# Patient Record
Sex: MALE | Race: WHITE | NOT HISPANIC OR LATINO | Employment: FULL TIME | URBAN - METROPOLITAN AREA
[De-identification: names, ages, dates, MRNs, and addresses within clinical notes are randomized per-mention and may not be internally consistent; named-entity substitution may affect disease eponyms.]

---

## 2017-07-26 ENCOUNTER — ALLSCRIPTS OFFICE VISIT (OUTPATIENT)
Dept: OTHER | Facility: OTHER | Age: 37
End: 2017-07-26

## 2017-07-26 DIAGNOSIS — R22.9 LOCALIZED SWELLING, MASS AND LUMP, UNSPECIFIED: ICD-10-CM

## 2017-08-01 ENCOUNTER — LAB CONVERSION - ENCOUNTER (OUTPATIENT)
Dept: OTHER | Facility: OTHER | Age: 37
End: 2017-08-01

## 2017-08-01 LAB
A/G RATIO (HISTORICAL): 1.4 (CALC) (ref 1–2.5)
ALBUMIN SERPL BCP-MCNC: 4.1 G/DL (ref 3.6–5.1)
ALP SERPL-CCNC: 138 U/L (ref 40–115)
ALT SERPL W P-5'-P-CCNC: 67 U/L (ref 9–46)
AST SERPL W P-5'-P-CCNC: 33 U/L (ref 10–40)
BASOPHILS # BLD AUTO: 1 %
BASOPHILS # BLD AUTO: 52 CELLS/UL (ref 0–200)
BILIRUB SERPL-MCNC: 0.3 MG/DL (ref 0.2–1.2)
BUN SERPL-MCNC: 17 MG/DL (ref 7–25)
BUN/CREA RATIO (HISTORICAL): ABNORMAL (CALC) (ref 6–22)
CALCIUM SERPL-MCNC: 9.6 MG/DL (ref 8.6–10.3)
CHLORIDE SERPL-SCNC: 102 MMOL/L (ref 98–110)
CHOLEST SERPL-MCNC: 176 MG/DL (ref 125–200)
CHOLEST/HDLC SERPL: 3.1 (CALC)
CO2 SERPL-SCNC: 28 MMOL/L (ref 20–31)
CREAT SERPL-MCNC: 0.98 MG/DL (ref 0.6–1.35)
DEPRECATED RDW RBC AUTO: 13.2 % (ref 11–15)
EGFR AFRICAN AMERICAN (HISTORICAL): 113 ML/MIN/1.73M2
EGFR-AMERICAN CALC (HISTORICAL): 97 ML/MIN/1.73M2
EOSINOPHIL # BLD AUTO: 1.7 %
EOSINOPHIL # BLD AUTO: 88 CELLS/UL (ref 15–500)
GAMMA GLOBULIN (HISTORICAL): 3 G/DL (CALC) (ref 1.9–3.7)
GLUCOSE (HISTORICAL): 87 MG/DL (ref 65–99)
HCT VFR BLD AUTO: 45 % (ref 38.5–50)
HDLC SERPL-MCNC: 57 MG/DL
HGB BLD-MCNC: 14.7 G/DL (ref 13.2–17.1)
LDL CHOLESTEROL (HISTORICAL): 108 MG/DL (CALC)
LYMPHOCYTES # BLD AUTO: 1674 CELLS/UL (ref 850–3900)
LYMPHOCYTES # BLD AUTO: 32.2 %
MCH RBC QN AUTO: 29.3 PG (ref 27–33)
MCHC RBC AUTO-ENTMCNC: 32.7 G/DL (ref 32–36)
MCV RBC AUTO: 89.8 FL (ref 80–100)
MONOCYTES # BLD AUTO: 468 CELLS/UL (ref 200–950)
MONOCYTES (HISTORICAL): 9 %
NEUTROPHILS # BLD AUTO: 2917 CELLS/UL (ref 1500–7800)
NEUTROPHILS # BLD AUTO: 56.1 %
NON-HDL-CHOL (CHOL-HDL) (HISTORICAL): 119 MG/DL (CALC)
PLATELET # BLD AUTO: 282 THOUSAND/UL (ref 140–400)
PMV BLD AUTO: 9.2 FL (ref 7.5–12.5)
POTASSIUM SERPL-SCNC: 4.6 MMOL/L (ref 3.5–5.3)
RBC # BLD AUTO: 5.01 MILLION/UL (ref 4.2–5.8)
SODIUM SERPL-SCNC: 136 MMOL/L (ref 135–146)
TOTAL PROTEIN (HISTORICAL): 7.1 G/DL (ref 6.1–8.1)
TRIGL SERPL-MCNC: 56 MG/DL
WBC # BLD AUTO: 5.2 THOUSAND/UL (ref 3.8–10.8)

## 2017-08-02 ENCOUNTER — GENERIC CONVERSION - ENCOUNTER (OUTPATIENT)
Dept: OTHER | Facility: OTHER | Age: 37
End: 2017-08-02

## 2017-08-07 ENCOUNTER — TRANSCRIBE ORDERS (OUTPATIENT)
Dept: ADMINISTRATIVE | Facility: HOSPITAL | Age: 37
End: 2017-08-07

## 2017-08-07 DIAGNOSIS — R22.9 LUMP OF SKIN: Primary | ICD-10-CM

## 2017-08-11 ENCOUNTER — HOSPITAL ENCOUNTER (OUTPATIENT)
Dept: RADIOLOGY | Age: 37
Discharge: HOME | End: 2017-08-11
Payer: COMMERCIAL

## 2017-08-11 DIAGNOSIS — R22.9 LOCALIZED SWELLING, MASS AND LUMP, UNSPECIFIED: ICD-10-CM

## 2017-08-11 PROCEDURE — 74177 CT ABD & PELVIS W/CONTRAST: CPT

## 2017-08-11 RX ADMIN — IOHEXOL 100 ML: 350 INJECTION, SOLUTION INTRAVENOUS at 15:01

## 2017-08-15 ENCOUNTER — GENERIC CONVERSION - ENCOUNTER (OUTPATIENT)
Dept: OTHER | Facility: OTHER | Age: 37
End: 2017-08-15

## 2018-01-13 VITALS
SYSTOLIC BLOOD PRESSURE: 140 MMHG | WEIGHT: 182.8 LBS | DIASTOLIC BLOOD PRESSURE: 90 MMHG | HEIGHT: 70 IN | TEMPERATURE: 97.5 F | BODY MASS INDEX: 26.17 KG/M2

## 2018-01-14 NOTE — RESULT NOTES
Discussion/Summary   Mild elevation of liver enzymes - cholesterol is stable  Repeat liver enzymes in 4 months and avoid alcohol and tylenol prior to test       Verified Results  (1) LIPID PANEL, FASTING [id number] 09:46AM Leafy Latin     Test Name Result Flag Reference   CHOLESTEROL, TOTAL 176 mg/dL  125-200   HDL CHOLESTEROL 57 mg/dL  > OR = 40   TRIGLICERIDES 56 mg/dL  <757   LDL-CHOLESTEROL 108 mg/dL (calc)  <130   Desirable range <100 mg/dL for patients with CHD or  diabetes and <70 mg/dL for diabetic patients with  known heart disease  CHOL/HDLC RATIO 3 1 (calc)  < OR = 5 0   NON HDL CHOLESTEROL 119 mg/dL (calc)     Target for non-HDL cholesterol is 30 mg/dL higher than   LDL cholesterol target  (1) COMPREHENSIVE METABOLIC PANEL [id number] 04:30CE Leafy Latin     Test Name Result Flag Reference   GLUCOSE 87 mg/dL  65-99   Fasting reference interval   UREA NITROGEN (BUN) 17 mg/dL  7-25   CREATININE 0 98 mg/dL  0 60-1 35   eGFR NON-AFR   AMERICAN 97 mL/min/1 73m2  > OR = 60   eGFR AFRICAN AMERICAN 113 mL/min/1 73m2  > OR = 60   BUN/CREATININE RATIO   9-52   NOT APPLICABLE (calc)   SODIUM 136 mmol/L  135-146   POTASSIUM 4 6 mmol/L  3 5-5 3   CHLORIDE 102 mmol/L     CARBON DIOXIDE 28 mmol/L  20-31   CALCIUM 9 6 mg/dL  8 6-10 3   PROTEIN, TOTAL 7 1 g/dL  6 1-8 1   ALBUMIN 4 1 g/dL  3 6-5 1   GLOBULIN 3 0 g/dL (calc)  1 9-3 7   ALBUMIN/GLOBULIN RATIO 1 4 (calc)  1 0-2 5   BILIRUBIN, TOTAL 0 3 mg/dL  0 2-1 2   ALKALINE PHOSPHATASE 138 U/L H    AST 33 U/L  10-40   ALT 67 U/L H 9-46     (1) CBC/PLT/DIFF [id number] 09:46AM Leafy Latin   REPORT COMMENT:  FASTING:YES     Test Name Result Flag Reference   WHITE BLOOD CELL COUNT 5 2 Thousand/uL  3 8-10 8   RED BLOOD CELL COUNT 5 01 Million/uL  4 20-5 80   HEMOGLOBIN 14 7 g/dL  13 2-17 1   HEMATOCRIT 45 0 %  38 5-50 0   MCV 89 8 fL  80 0-100 0   MCH 29 3 pg  27 0-33 0   MCHC 32 7 g/dL  32 0-36 0   RDW 13 2 %  11 0-15 0 PLATELET COUNT 045 Thousand/uL  140-400   ABSOLUTE NEUTROPHILS 2917 cells/uL  2941-1171   ABSOLUTE LYMPHOCYTES 1674 cells/uL  850-3900   ABSOLUTE MONOCYTES 468 cells/uL  200-950   ABSOLUTE EOSINOPHILS 88 cells/uL     ABSOLUTE BASOPHILS 52 cells/uL  0-200   NEUTROPHILS 56 1 %     LYMPHOCYTES 32 2 %     MONOCYTES 9 0 %     EOSINOPHILS 1 7 %     BASOPHILS 1 0 %     MPV 9 2 fL  7 5-12 5       Plan  Elevation of level of transaminase or lactic acid dehydrogenase (LDH)    · (1) COMPREHENSIVE METABOLIC PANEL; Status:Active;  Requested for:[id number];

## 2018-01-16 NOTE — RESULT NOTES
Verified Results  * CT ABDOMEN PELVIS W CONTRAST [id number] 02:47PM Elizabeth Daugherty Order Number: [id number]    - Patient Instructions: To schedule this appointment, please contact Central Scheduling at [phone]  Test Name Result Flag Reference   CT ABDOMEN PELVIS W CONTRAST (Report)     CT ABDOMEN AND PELVIS WITH IV CONTRAST     INDICATION: 53-year-old male, right lower quadrant pain, swelling     COMPARISON: None  TECHNIQUE: CT examination of the abdomen and pelvis was performed  Reformatted images were created in axial, sagittal, and coronal planes  Radiation dose length product (DLP) for this visit: 431 mGy-cm   This examination, like all CT scans performed in the [facility], was performed utilizing techniques to minimize radiation dose exposure, including the use of iterative    reconstruction and automated exposure control  IV Contrast: 100 mL of iohexol (OMNIPAQUE)      Enteric Contrast: Enteric contrast was administered  FINDINGS:     ABDOMEN     LOWER CHEST: No significant abnormalities identified in the lower chest      LIVER/BILIARY TREE: Unremarkable  GALLBLADDER: No calcified gallstones  No pericholecystic inflammatory change  SPLEEN: Unremarkable  PANCREAS: Unremarkable  ADRENAL GLANDS: Unremarkable  KIDNEYS/URETERS: Unremarkable  No hydronephrosis  STOMACH AND BOWEL: No evidence of bowel obstruction  Difficult to assess adequately due to paucity of oral contrast  Mild diffuse rectal wall thickening suggesting proctitis, infectious or inflammatory  Sigmoidoscopic assessment recommended  APPENDIX: No findings to suggest appendicitis  ABDOMINOPELVIC CAVITY: No ascites or free intraperitoneal air  No lymphadenopathy  VESSELS: Unremarkable for patient's age  PELVIS     REPRODUCTIVE ORGANS: Unremarkable for patient's age  URINARY BLADDER: Unremarkable  ABDOMINAL WALL/INGUINAL REGIONS: Unremarkable  OSSEOUS STRUCTURES: No acute fracture or destructive osseous lesion  IMPRESSION:   No evidence of abdominal wall mass or hernia     Mild diffuse rectal wall thickening suspicious for proctitis           ##[id number]##sigslh       Workstation performed: [id number]     Signed by:   Wolf Cunningham MD   8/15/17

## 2018-07-27 ENCOUNTER — OFFICE VISIT (OUTPATIENT)
Dept: FAMILY MEDICINE CLINIC | Facility: CLINIC | Age: 38
End: 2018-07-27
Payer: COMMERCIAL

## 2018-07-27 VITALS
HEART RATE: 72 BPM | HEIGHT: 70 IN | RESPIRATION RATE: 16 BRPM | DIASTOLIC BLOOD PRESSURE: 78 MMHG | WEIGHT: 188 LBS | TEMPERATURE: 97.4 F | SYSTOLIC BLOOD PRESSURE: 116 MMHG | BODY MASS INDEX: 26.92 KG/M2

## 2018-07-27 DIAGNOSIS — K52.9 COLITIS: Primary | ICD-10-CM

## 2018-07-27 PROCEDURE — 1036F TOBACCO NON-USER: CPT | Performed by: FAMILY MEDICINE

## 2018-07-27 PROCEDURE — 3008F BODY MASS INDEX DOCD: CPT | Performed by: FAMILY MEDICINE

## 2018-07-27 PROCEDURE — 99214 OFFICE O/P EST MOD 30 MIN: CPT | Performed by: FAMILY MEDICINE

## 2018-07-27 RX ORDER — DICYCLOMINE HCL 20 MG
1 TABLET ORAL EVERY 6 HOURS PRN
COMMUNITY
Start: 2018-07-26 | End: 2019-08-14 | Stop reason: ALTCHOICE

## 2018-07-27 NOTE — PATIENT INSTRUCTIONS
Colitis   WHAT YOU NEED TO KNOW:   Colitis is swelling and irritation of your colon  Colitis may be caused by ulcers or a problem with your immune system  Bacteria, a virus, or a parasite may also cause colitis  The cause may not be known  You may have diarrhea, abdominal pain, fever, or blood or mucus in your bowel movement  DISCHARGE INSTRUCTIONS:   Return to the emergency department if:   · You have sudden trouble breathing  · Your bowel movements are black or have blood in them  · You have blood in your vomit  · You have severe abdominal pain or your abdomen is swollen and feels hard  · You have any of the following signs of dehydration:     ¨ Dizziness or weakness    ¨ Dry mouth, cracked lips, or severe thirst    ¨ Fast heartbeat or breathing    ¨ Urinating very little or not at all  Contact your healthcare provider if:   · Your symptoms get worse or do not go away  · You have a fever, chills, cough, or feel weak and achy  · You suddenly lose weight without trying  · You have questions or concerns about your condition or care  Medicines:   · Medicines  may be given to decrease inflammation in your colon and treat diarrhea  · Take your medicine as directed  Contact your healthcare provider if you think your medicine is not helping or if you have side effects  Tell him of her if you are allergic to any medicine  Keep a list of the medicines, vitamins, and herbs you take  Include the amounts, and when and why you take them  Bring the list or the pill bottles to follow-up visits  Carry your medicine list with you in case of an emergency  Manage your symptoms:   · Drink liquids as directed  to help prevent dehydration  Good liquids to drink include water, juice, and broth  Ask how much liquid to drink each day  You may need to drink an oral rehydration solution (ORS)  An ORS contains a balance of water, salt, and sugar to replace body fluids lost during diarrhea       · Eat a variety of healthy foods  Healthy foods include fruits, vegetables, whole-grain breads, beans, low-fat dairy products, lean meats, and fish  You may need to eat several small meals throughout the day instead of large meals  Avoid spicy foods, caffeine, chocolate, and foods high in fat  · Talk to your healthcare provider before you take NSAIDs  NSAIDs can cause worsen your symptoms if ulcers are causing your colitis  · Start to exercise when you feel better  Regular exercise helps your bowels work normally  Ask about the best exercise plan for you  Follow up with your healthcare provider as directed: You may need to return for a colonoscopy or other tests  Write down how often you have a bowel movements and what they look like  Bring this to your follow-up visits  Write down your questions so you remember to ask them during your visits  © 2017 [employer] Information is for End User's use only and may not be sold, redistributed or otherwise used for commercial purposes  All illustrations and images included in CareNotes® are the copyrighted property of A [employer]  or [employer]  The above information is an  only  It is not intended as medical advice for individual conditions or treatments  Talk to your doctor, nurse or pharmacist before following any medical regimen to see if it is safe and effective for you

## 2018-07-27 NOTE — PROGRESS NOTES
FAMILY MEDICINE PROGRESS NOTE  Christopher Carrera 42 y o  male   DATE: July 27, 2018     ASSESSMENT and PLAN:  Christopher Carrera is a 42 y o  male with:     1  Colitis  Patient seen in the ER at [facility] yesterday, I reviewed the CT scan that showed left-sided colitis  CBC, CMP, lipase were all within normal limits, so less likely infectious  May have inflammatory colitis, patient may need a workup this recurs in the future, though denies any similar previous episodes but does have irregular bowel habits  Patient has not yet picked up his dicyclomine, encouraged him to start taking the medication, follow the diet that he was given in the ER, advised of signs and symptoms for which to call back or go to the ER  If symptoms persist would refer to GI for possible colonoscopy and workup for ulcerative colitis  SUBJECTIVE:  Christopher Carrera is a 42 y o  male who presents today with a chief complaint of Follow-up (ER yesterday lower abdominal pain)  Started 3-4 days ago with an uneasy feeling and thought it was just his usual odd stomach sensation  But then over the next few days he developed headaches, chills, sweats with light diarrhea, nausea so went to the ER  He was told it was an inflamed intestine and given Dicyclomine  Started doing just chicken noodle soup  Tmax on home temp was 102 4 F  Denies blood in the stool, vomiting  Pt was in the [facility] ED yesterday and was diagnosed with colitis  CT abd showed Colitis, most severe along the left colon  I reviewed the labs that the patient had done in the ER  CBC had normal WBC count of 6 8, no neutrophilia, normal lab  CMP was normal other than an elevated alk-phos of 179, AST 41 and ALT 58 chest mildly elevated, low albumin 3 3, otherwise normal CMP  Lipase normal 125  Doesn't think he's had this before  He had a "bowel blockage" as a teenager  Took Correctol and now is regulated to once a week BMs        Review of Systems   Constitutional:        See HPI for ROS     I have reviewed the patient's PMH, Social History, Medication List and Allergies as appropriate  OBJECTIVE:  /78   Pulse 72   Temp (!) 97 4 °F (36 3 °C)   Resp 16   Ht 5' 10" (1 778 m)   Wt 85 3 kg (188 lb)   BMI 26 98 kg/m²    Physical Exam   Constitutional: He appears well-developed and well-nourished  No distress  HENT:   Head: Normocephalic and atraumatic  Cardiovascular: Normal rate, regular rhythm and normal heart sounds  No murmur heard  Pulmonary/Chest: Effort normal and breath sounds normal  No respiratory distress  He has no wheezes  Abdominal: Soft  Normal appearance and bowel sounds are normal  There is no hepatosplenomegaly  There is tenderness in the left lower quadrant  There is no rigidity, no rebound and no guarding  Neurological: He is alert  Skin: He is not diaphoretic  Vitals reviewed  Abhishek Sullivan MD    Note: Portions of the record may have been created with voice recognition software  Occasional wrong word or "sound a like" substitutions may have occurred due to the inherent limitations of voice recognition software  Read the chart carefully and recognize, using context, where substitutions have occurred

## 2019-07-18 ENCOUNTER — OFFICE VISIT (OUTPATIENT)
Dept: OBGYN CLINIC | Facility: CLINIC | Age: 39
End: 2019-07-18
Payer: OTHER MISCELLANEOUS

## 2019-07-18 ENCOUNTER — APPOINTMENT (OUTPATIENT)
Dept: RADIOLOGY | Facility: CLINIC | Age: 39
End: 2019-07-18
Payer: OTHER MISCELLANEOUS

## 2019-07-18 VITALS
DIASTOLIC BLOOD PRESSURE: 79 MMHG | HEIGHT: 70 IN | HEART RATE: 76 BPM | SYSTOLIC BLOOD PRESSURE: 142 MMHG | WEIGHT: 185 LBS | BODY MASS INDEX: 26.48 KG/M2

## 2019-07-18 DIAGNOSIS — S52.601A CLOSED FRACTURE OF DISTAL ENDS OF RIGHT RADIUS AND ULNA, INITIAL ENCOUNTER: Primary | ICD-10-CM

## 2019-07-18 DIAGNOSIS — S52.501A CLOSED FRACTURE OF DISTAL ENDS OF RIGHT RADIUS AND ULNA, INITIAL ENCOUNTER: Primary | ICD-10-CM

## 2019-07-18 DIAGNOSIS — M25.531 PAIN IN RIGHT WRIST: ICD-10-CM

## 2019-07-18 DIAGNOSIS — M25.531 PAIN IN RIGHT WRIST: Primary | ICD-10-CM

## 2019-07-18 PROCEDURE — 25600 CLTX DST RDL FX/EPHYS SEP WO: CPT | Performed by: ORTHOPAEDIC SURGERY

## 2019-07-18 PROCEDURE — 73110 X-RAY EXAM OF WRIST: CPT

## 2019-07-18 PROCEDURE — 99203 OFFICE O/P NEW LOW 30 MIN: CPT | Performed by: ORTHOPAEDIC SURGERY

## 2019-07-18 NOTE — PROGRESS NOTES
Assessment/Plan:  1  Closed fracture of distal end of right radius, initial encounter  Fracture / Dislocation Treatment   2  Pain in right wrist         Scribe Attestation    I,:   Daniel Sosa MA am acting as a scribe while in the presence of the attending physician :        I,:   Tisha Tucker, DO personally performed the services described in this documentation    as scribed in my presence :            The physiology of a fractured bone was discussed with the patient today  With nondisplaced or minimally displaced fractures, conservative treatment often results in a functional recovery  Typically, these fractures are immobilized in either a cast or splint depending on the pattern  Radiographs are typically taken at intervals throughout the fracture healing to ensure that muscular forces do not cause loss of reduction or alignment  If the fracture loses its alignment, surgical intervention may be required to stabilize it  Medical conditions such as diabetes, osteoporosis, vitamin D deficiency, and a history of or exposure to smoking may delay or prevent fracture healing  Patient was placed a short-arm cast today, which is tolerated well  Did advise him that we will likely be immobilized for 6 weeks time  He will be placed in this cast for 4 weeks and then transition to removable brace for 2 more weeks  Will see him back in 1 week for a recheck and new x-rays in the cast     Subjective:   Phylicia Thomas is a 34 y o  male who presents to the office today for evaluation of his right wrist   This is a worker's compensation case  Patient states on 07/09/2019 he fell off a trailer at work and sustained injury to his right wrist   Patient states that he was away for work in [place] and was seen in emergency room close the area in which he was working in  X-rays were obtained in the ED in his placed a splint    Patient states he was not cleared to see a physician by his worker's  until today   For the last 2 weeks he has been wearing his splint and has only removed it wants to readjust it  Today he denies any significant pain in the splint  He denies any numbness and tingling  He denies any new injury  Review of Systems   Constitutional: Negative for chills, fever and unexpected weight change  HENT: Negative for hearing loss, nosebleeds and sore throat  Eyes: Negative for pain, redness and visual disturbance  Respiratory: Negative for cough, shortness of breath and wheezing  Cardiovascular: Negative for chest pain, palpitations and leg swelling  Gastrointestinal: Negative for abdominal pain, nausea and vomiting  Endocrine: Negative for polydipsia and polyuria  Genitourinary: Negative for dysuria and hematuria  Musculoskeletal: Positive for arthralgias, joint swelling and myalgias  Skin: Negative for rash and wound  Neurological: Negative for dizziness, numbness and headaches  Psychiatric/Behavioral: Negative for agitation, decreased concentration and suicidal ideas           Past Medical History:   Diagnosis Date    Small bowel obstruction (HCC)        Past Surgical History:   Procedure Laterality Date    WISDOM TOOTH EXTRACTION         Family History   Problem Relation Age of Onset    No Known Problems Mother     Asthma Sister        Social History     Occupational History    Not on file   Tobacco Use    Smoking status: Never Smoker    Smokeless tobacco: Never Used   Substance and Sexual Activity    Alcohol use: Yes     Comment: rare    Drug use: No    Sexual activity: Not on file         Current Outpatient Medications:     dicyclomine (BENTYL) 20 mg tablet, Take 1 tablet by mouth every 6 (six) hours as needed, Disp: , Rfl:     No Known Allergies    Objective:  Vitals:    07/18/19 1354   BP: 142/79   Pulse: 76       Ortho Exam   Right wrist:  Skin intact  Moderate swelling noted  Ecchymosis noted  Tender to palpate over the distal radius  Full and free finger range of motion noted  Sensation intact  Radial pulse 2 +  No deformity      Physical Exam   Constitutional: He is oriented to person, place, and time  He appears well-developed  HENT:   Head: Normocephalic and atraumatic  Eyes: Conjunctivae are normal    Neck: Neck supple  Cardiovascular: Intact distal pulses  Pulmonary/Chest: Effort normal    Neurological: He is alert and oriented to person, place, and time  Skin: Skin is warm and dry  Psychiatric: He has a normal mood and affect  His behavior is normal        I have personally reviewed pertinent films in PACS and my interpretation is as follows:    X-rays or right wrist obtained on 7/18/19 demonstrate distal radius fracture that is in stable alignment  There is near anatomic alignment of the fracture        Fracture / Dislocation Treatment  Date/Time: 7/18/2019 2:02 PM  Performed by: Martha Guillaume DO  Authorized by: Martha Guillaume DO     Patient Location:  Clinic  Consent given by:  Patient  Patient identity confirmed:  Verbally with patient  Injury location:  Forearm  Location details:  Right forearm  Injury type:  Fracture  Fracture type: distal radius    Fracture type: distal radius    Neurovascular status: Neurovascularly intact    Distal perfusion: normal    Neurological function: normal    Range of motion: reduced    Manipulation performed?: No    Immobilization:  Cast  Cast type:  Short arm  Neurovascular status: Neurovascularly intact    Distal perfusion: normal    Neurological function: normal    Range of motion: unchanged    Patient tolerance:  Patient tolerated the procedure well with no immediate complications

## 2019-07-22 ENCOUNTER — TELEPHONE (OUTPATIENT)
Dept: OBGYN CLINIC | Facility: HOSPITAL | Age: 39
End: 2019-07-22

## 2019-07-22 NOTE — TELEPHONE ENCOUNTER
Dr Shara Andre called to see if when patient is in on 7/25/19 if we could address the work status of patient  Pt off unit via wheelchair w/transporter to MRI.

## 2019-07-25 ENCOUNTER — APPOINTMENT (OUTPATIENT)
Dept: RADIOLOGY | Facility: CLINIC | Age: 39
End: 2019-07-25
Payer: OTHER MISCELLANEOUS

## 2019-07-25 ENCOUNTER — OFFICE VISIT (OUTPATIENT)
Dept: OBGYN CLINIC | Facility: CLINIC | Age: 39
End: 2019-07-25

## 2019-07-25 VITALS
SYSTOLIC BLOOD PRESSURE: 114 MMHG | HEART RATE: 76 BPM | DIASTOLIC BLOOD PRESSURE: 77 MMHG | WEIGHT: 181.2 LBS | BODY MASS INDEX: 25.94 KG/M2 | HEIGHT: 70 IN

## 2019-07-25 DIAGNOSIS — S52.601D CLOSED FRACTURE OF DISTAL ENDS OF RIGHT RADIUS AND ULNA WITH ROUTINE HEALING, SUBSEQUENT ENCOUNTER: ICD-10-CM

## 2019-07-25 DIAGNOSIS — S52.501D CLOSED FRACTURE OF DISTAL ENDS OF RIGHT RADIUS AND ULNA WITH ROUTINE HEALING, SUBSEQUENT ENCOUNTER: Primary | ICD-10-CM

## 2019-07-25 DIAGNOSIS — S52.501D CLOSED FRACTURE OF DISTAL ENDS OF RIGHT RADIUS AND ULNA WITH ROUTINE HEALING, SUBSEQUENT ENCOUNTER: ICD-10-CM

## 2019-07-25 DIAGNOSIS — S52.601D CLOSED FRACTURE OF DISTAL ENDS OF RIGHT RADIUS AND ULNA WITH ROUTINE HEALING, SUBSEQUENT ENCOUNTER: Primary | ICD-10-CM

## 2019-07-25 PROCEDURE — 99024 POSTOP FOLLOW-UP VISIT: CPT | Performed by: ORTHOPAEDIC SURGERY

## 2019-07-25 PROCEDURE — 73100 X-RAY EXAM OF WRIST: CPT

## 2019-07-25 NOTE — PROGRESS NOTES
Assessment/Plan:  1  Closed fracture of distal ends of right radius and ulna with routine healing, subsequent encounter  XR wrist 2 vw right       Scribe Attestation    I,:   Eve Robertson MA am acting as a scribe while in the presence of the attending physician :        I,:   Isabella Garcia, DO personally performed the services described in this documentation    as scribed in my presence :              Jose Manuel Bennett is doing well  Short arm cast is intact and fitting appropriately  X-rays are unchanged and we can continue to treat this conservatively  Patient will continue with the short arm cast for the next 2 weeks  Patient will follow up in 2 weeks for cast removal and repeat x-ray  Patient will remain out of work at this time  Subjective:   Anaya Barker is a 34 y o  male who presents to the office today for follow up evaluation 1 week s/p closed treatment for a right distal radius fracture  Patient states he has been compliant with cast use  He denies any issues with the cast        Review of Systems   Constitutional: Negative for chills and fever  HENT: Negative for drooling and sneezing  Eyes: Negative for redness  Respiratory: Negative for cough and wheezing  Gastrointestinal: Negative for nausea and vomiting  Musculoskeletal: Negative for arthralgias, joint swelling and myalgias  Neurological: Negative for weakness and numbness  Psychiatric/Behavioral: Negative for behavioral problems  The patient is not nervous/anxious            Past Medical History:   Diagnosis Date    Small bowel obstruction (HCC)        Past Surgical History:   Procedure Laterality Date    WISDOM TOOTH EXTRACTION         Family History   Problem Relation Age of Onset    No Known Problems Mother     Asthma Sister        Social History     Occupational History    Not on file   Tobacco Use    Smoking status: Never Smoker    Smokeless tobacco: Never Used   Substance and Sexual Activity    Alcohol use: Yes     Comment: rare    Drug use: No    Sexual activity: Not on file         Current Outpatient Medications:     dicyclomine (BENTYL) 20 mg tablet, Take 1 tablet by mouth every 6 (six) hours as needed, Disp: , Rfl:     No Known Allergies    Objective: There were no vitals filed for this visit  Ortho Exam     Right wrist    80 sup  85 pro  Short arm cast intact   No wounds secondary to cast  Compartments soft  Brisk capillary refill  Sensation intact median, radial, and ulnar nerve     Physical Exam   Constitutional: He is oriented to person, place, and time  He appears well-developed and well-nourished  HENT:   Head: Normocephalic and atraumatic  Eyes: Conjunctivae are normal  Right eye exhibits no discharge  Left eye exhibits no discharge  Neck: Normal range of motion  Neck supple  Cardiovascular: Normal rate and intact distal pulses  Pulmonary/Chest: Effort normal  No respiratory distress  Musculoskeletal:   As noted in HPI   Neurological: He is alert and oriented to person, place, and time  Skin: Skin is warm and dry  Psychiatric: He has a normal mood and affect  His behavior is normal  Judgment and thought content normal        I have personally reviewed pertinent films in PACS and my interpretation is as follows:X-ray right wrist performed in the office today are unchanged from previous films  Alignment is excellent

## 2019-08-08 ENCOUNTER — OFFICE VISIT (OUTPATIENT)
Dept: OBGYN CLINIC | Facility: CLINIC | Age: 39
End: 2019-08-08

## 2019-08-08 ENCOUNTER — APPOINTMENT (OUTPATIENT)
Dept: RADIOLOGY | Facility: CLINIC | Age: 39
End: 2019-08-08
Payer: OTHER MISCELLANEOUS

## 2019-08-08 ENCOUNTER — TRANSCRIBE ORDERS (OUTPATIENT)
Dept: ADMINISTRATIVE | Facility: HOSPITAL | Age: 39
End: 2019-08-08

## 2019-08-08 ENCOUNTER — APPOINTMENT (OUTPATIENT)
Dept: LAB | Facility: HOSPITAL | Age: 39
End: 2019-08-08
Attending: ORTHOPAEDIC SURGERY
Payer: OTHER MISCELLANEOUS

## 2019-08-08 VITALS
SYSTOLIC BLOOD PRESSURE: 132 MMHG | HEART RATE: 85 BPM | HEIGHT: 70 IN | BODY MASS INDEX: 25.91 KG/M2 | DIASTOLIC BLOOD PRESSURE: 78 MMHG | WEIGHT: 181 LBS

## 2019-08-08 DIAGNOSIS — S52.601D CLOSED FRACTURE OF DISTAL ENDS OF RIGHT RADIUS AND ULNA WITH ROUTINE HEALING, SUBSEQUENT ENCOUNTER: ICD-10-CM

## 2019-08-08 DIAGNOSIS — S52.601D CLOSED FRACTURE OF DISTAL ENDS OF RIGHT RADIUS AND ULNA WITH ROUTINE HEALING, SUBSEQUENT ENCOUNTER: Primary | ICD-10-CM

## 2019-08-08 DIAGNOSIS — S52.501D CLOSED FRACTURE OF DISTAL ENDS OF RIGHT RADIUS AND ULNA WITH ROUTINE HEALING, SUBSEQUENT ENCOUNTER: ICD-10-CM

## 2019-08-08 DIAGNOSIS — S52.501D CLOSED FRACTURE OF DISTAL ENDS OF RIGHT RADIUS AND ULNA WITH ROUTINE HEALING, SUBSEQUENT ENCOUNTER: Primary | ICD-10-CM

## 2019-08-08 PROBLEM — S52.501A CLOSED FRACTURE OF RIGHT DISTAL RADIUS AND ULNA: Status: ACTIVE | Noted: 2019-08-08

## 2019-08-08 PROBLEM — S52.601A CLOSED FRACTURE OF RIGHT DISTAL RADIUS AND ULNA: Status: ACTIVE | Noted: 2019-08-08

## 2019-08-08 LAB
ANION GAP SERPL CALCULATED.3IONS-SCNC: 9 MMOL/L (ref 4–13)
APTT PPP: 25 SECONDS (ref 23–37)
BASOPHILS # BLD AUTO: 0.06 THOUSANDS/ΜL (ref 0–0.1)
BASOPHILS NFR BLD AUTO: 1 % (ref 0–1)
BUN SERPL-MCNC: 9 MG/DL (ref 5–25)
CALCIUM SERPL-MCNC: 9.1 MG/DL (ref 8.3–10.1)
CHLORIDE SERPL-SCNC: 102 MMOL/L (ref 100–108)
CO2 SERPL-SCNC: 29 MMOL/L (ref 21–32)
CREAT SERPL-MCNC: 0.96 MG/DL (ref 0.6–1.3)
EOSINOPHIL # BLD AUTO: 0.11 THOUSAND/ΜL (ref 0–0.61)
EOSINOPHIL NFR BLD AUTO: 2 % (ref 0–6)
ERYTHROCYTE [DISTWIDTH] IN BLOOD BY AUTOMATED COUNT: 13.3 % (ref 11.6–15.1)
GFR SERPL CREATININE-BSD FRML MDRD: 98 ML/MIN/1.73SQ M
GLUCOSE SERPL-MCNC: 119 MG/DL (ref 65–140)
HCT VFR BLD AUTO: 42.7 % (ref 36.5–49.3)
HGB BLD-MCNC: 13.9 G/DL (ref 12–17)
IMM GRANULOCYTES # BLD AUTO: 0.03 THOUSAND/UL (ref 0–0.2)
IMM GRANULOCYTES NFR BLD AUTO: 1 % (ref 0–2)
INR PPP: 0.98 (ref 0.86–1.16)
LYMPHOCYTES # BLD AUTO: 1.66 THOUSANDS/ΜL (ref 0.6–4.47)
LYMPHOCYTES NFR BLD AUTO: 26 % (ref 14–44)
MCH RBC QN AUTO: 28.3 PG (ref 26.8–34.3)
MCHC RBC AUTO-ENTMCNC: 32.6 G/DL (ref 31.4–37.4)
MCV RBC AUTO: 87 FL (ref 82–98)
MONOCYTES # BLD AUTO: 0.68 THOUSAND/ΜL (ref 0.17–1.22)
MONOCYTES NFR BLD AUTO: 11 % (ref 4–12)
NEUTROPHILS # BLD AUTO: 3.91 THOUSANDS/ΜL (ref 1.85–7.62)
NEUTS SEG NFR BLD AUTO: 59 % (ref 43–75)
NRBC BLD AUTO-RTO: 0 /100 WBCS
PLATELET # BLD AUTO: 307 THOUSANDS/UL (ref 149–390)
PMV BLD AUTO: 9.6 FL (ref 8.9–12.7)
POTASSIUM SERPL-SCNC: 3.4 MMOL/L (ref 3.5–5.3)
PROTHROMBIN TIME: 10.3 SECONDS (ref 9.4–11.7)
RBC # BLD AUTO: 4.91 MILLION/UL (ref 3.88–5.62)
SODIUM SERPL-SCNC: 140 MMOL/L (ref 136–145)
WBC # BLD AUTO: 6.45 THOUSAND/UL (ref 4.31–10.16)

## 2019-08-08 PROCEDURE — 80048 BASIC METABOLIC PNL TOTAL CA: CPT

## 2019-08-08 PROCEDURE — 99024 POSTOP FOLLOW-UP VISIT: CPT | Performed by: ORTHOPAEDIC SURGERY

## 2019-08-08 PROCEDURE — 36415 COLL VENOUS BLD VENIPUNCTURE: CPT

## 2019-08-08 PROCEDURE — 85730 THROMBOPLASTIN TIME PARTIAL: CPT

## 2019-08-08 PROCEDURE — 73100 X-RAY EXAM OF WRIST: CPT

## 2019-08-08 PROCEDURE — 85610 PROTHROMBIN TIME: CPT

## 2019-08-08 PROCEDURE — 85025 COMPLETE CBC W/AUTO DIFF WBC: CPT

## 2019-08-08 RX ORDER — CEFAZOLIN SODIUM 2 G/50ML
2000 SOLUTION INTRAVENOUS ONCE
Status: CANCELLED | OUTPATIENT
Start: 2019-08-21 | End: 2019-08-08

## 2019-08-08 NOTE — H&P (VIEW-ONLY)
Assessment/Plan:  1  Closed fracture of distal ends of right radius and ulna with routine healing, subsequent encounter  XR wrist 2 vw right       Scribe Attestation    I,:   Eve Robertson MA am acting as a scribe while in the presence of the attending physician :        I,:   Deana Cabezas DO personally performed the services described in this documentation    as scribed in my presence :              I discussed with Delfino Hermosillo today that his fracture did displace as compared to previous films  There is increased dorsal angulation at this time  His priors x-rays demonstrated anatomic alignment  This was confirmed on multiple x-rays in the past   Unfortunately the fracture has moved  This was explained to the patient  It was explained to the patient that he does run the risk of DRUJ hand carpal arthritis if this will ladder healing but in proper position  He also may have stiffness loss of range of motion with subluxation of the DRUJ  Treatment options were discussed in the form of open reduction internal fixation right distal radius as well as continued conservative care  Benny Alegria He was agreeable to this  Risk of the surgery are inclusive of but not limited to bleeding, infection, nerve injury, blood clot, worsening of symptoms, not achieving the anticipated results, persistent stiffness, weakness and the need for additional surgery  The patient verbally stated they understood those risks and would like to proceed with the surgery  Surgical consent was signed in the office today  He was fitted and provided with a cock-up wrist brace that he was instructed to wear full time  He will remain nonweightbearing with the RUE  I will see him the day of surgery  Subjective:   Ida Mcclure is a 34 y o  male who presents to the office today for follow up evaluation s/p closed treatment for a right distal radius fracture   Patient states that his cast was loose and it did come off one time two days ago while he was sleeping  He denies any numbness or tingling  Review of Systems   Constitutional: Negative for chills and fever  HENT: Negative for drooling and sneezing  Eyes: Negative for redness  Respiratory: Negative for cough and wheezing  Gastrointestinal: Negative for nausea and vomiting  Musculoskeletal: Negative for arthralgias, joint swelling and myalgias  Neurological: Negative for weakness and numbness  Psychiatric/Behavioral: Negative for behavioral problems  The patient is not nervous/anxious  Past Medical History:   Diagnosis Date    Small bowel obstruction (HCC)        Past Surgical History:   Procedure Laterality Date    WISDOM TOOTH EXTRACTION         Family History   Problem Relation Age of Onset    No Known Problems Mother     Asthma Sister        Social History     Occupational History    Not on file   Tobacco Use    Smoking status: Never Smoker    Smokeless tobacco: Never Used   Substance and Sexual Activity    Alcohol use: Yes     Comment: rare    Drug use: No    Sexual activity: Not on file         Current Outpatient Medications:     dicyclomine (BENTYL) 20 mg tablet, Take 1 tablet by mouth every 6 (six) hours as needed, Disp: , Rfl:     No Known Allergies    Objective: There were no vitals filed for this visit  Ortho Exam     Right wrist    No wounds secondary to cast  Compartments soft  Brisk capillary refill  Sensation intact median, radial, and ulnar nerve   Motor intact median radial nerve  Mild deformity at the wrist dorsal angulation    Physical Exam   Constitutional: He is oriented to person, place, and time  He appears well-developed and well-nourished  HENT:   Head: Normocephalic and atraumatic  Eyes: Conjunctivae are normal  Right eye exhibits no discharge  Left eye exhibits no discharge  Neck: Normal range of motion  Neck supple  Cardiovascular: Normal rate and intact distal pulses  Pulmonary/Chest: Effort normal  No respiratory distress  Musculoskeletal:   As noted in HPI   Neurological: He is alert and oriented to person, place, and time  Skin: Skin is warm and dry  Psychiatric: He has a normal mood and affect  His behavior is normal  Judgment and thought content normal        I have personally reviewed pertinent films in PACS and my interpretation is as follows:X-ray right wrist performed in the office today demonstrates further displacement right distal radius fracture  There is approximately 30° of dorsal angulation at this time

## 2019-08-08 NOTE — PROGRESS NOTES
Assessment/Plan:  1  Closed fracture of distal ends of right radius and ulna with routine healing, subsequent encounter  XR wrist 2 vw right       Scribe Attestation    I,:   Eve Robertson MA am acting as a scribe while in the presence of the attending physician :        I,:   Goran Flores DO personally performed the services described in this documentation    as scribed in my presence :              I discussed with Guillermo Whyte today that his fracture did displace as compared to previous films  There is increased dorsal angulation at this time  His priors x-rays demonstrated anatomic alignment  This was confirmed on multiple x-rays in the past   Unfortunately the fracture has moved  This was explained to the patient  It was explained to the patient that he does run the risk of DRUJ hand carpal arthritis if this will ladder healing but in proper position  He also may have stiffness loss of range of motion with subluxation of the DRUJ  Treatment options were discussed in the form of open reduction internal fixation right distal radius as well as continued conservative care  Roger Sr He was agreeable to this  Risk of the surgery are inclusive of but not limited to bleeding, infection, nerve injury, blood clot, worsening of symptoms, not achieving the anticipated results, persistent stiffness, weakness and the need for additional surgery  The patient verbally stated they understood those risks and would like to proceed with the surgery  Surgical consent was signed in the office today  He was fitted and provided with a cock-up wrist brace that he was instructed to wear full time  He will remain nonweightbearing with the RUE  I will see him the day of surgery  Subjective:   Monica Rodriguez is a 34 y o  male who presents to the office today for follow up evaluation s/p closed treatment for a right distal radius fracture   Patient states that his cast was loose and it did come off one time two days ago while he was sleeping  He denies any numbness or tingling  Review of Systems   Constitutional: Negative for chills and fever  HENT: Negative for drooling and sneezing  Eyes: Negative for redness  Respiratory: Negative for cough and wheezing  Gastrointestinal: Negative for nausea and vomiting  Musculoskeletal: Negative for arthralgias, joint swelling and myalgias  Neurological: Negative for weakness and numbness  Psychiatric/Behavioral: Negative for behavioral problems  The patient is not nervous/anxious  Past Medical History:   Diagnosis Date    Small bowel obstruction (HCC)        Past Surgical History:   Procedure Laterality Date    WISDOM TOOTH EXTRACTION         Family History   Problem Relation Age of Onset    No Known Problems Mother     Asthma Sister        Social History     Occupational History    Not on file   Tobacco Use    Smoking status: Never Smoker    Smokeless tobacco: Never Used   Substance and Sexual Activity    Alcohol use: Yes     Comment: rare    Drug use: No    Sexual activity: Not on file         Current Outpatient Medications:     dicyclomine (BENTYL) 20 mg tablet, Take 1 tablet by mouth every 6 (six) hours as needed, Disp: , Rfl:     No Known Allergies    Objective: There were no vitals filed for this visit  Ortho Exam     Right wrist    No wounds secondary to cast  Compartments soft  Brisk capillary refill  Sensation intact median, radial, and ulnar nerve   Motor intact median radial nerve  Mild deformity at the wrist dorsal angulation    Physical Exam   Constitutional: He is oriented to person, place, and time  He appears well-developed and well-nourished  HENT:   Head: Normocephalic and atraumatic  Eyes: Conjunctivae are normal  Right eye exhibits no discharge  Left eye exhibits no discharge  Neck: Normal range of motion  Neck supple  Cardiovascular: Normal rate and intact distal pulses  Pulmonary/Chest: Effort normal  No respiratory distress  Musculoskeletal:   As noted in HPI   Neurological: He is alert and oriented to person, place, and time  Skin: Skin is warm and dry  Psychiatric: He has a normal mood and affect  His behavior is normal  Judgment and thought content normal        I have personally reviewed pertinent films in PACS and my interpretation is as follows:X-ray right wrist performed in the office today demonstrates further displacement right distal radius fracture  There is approximately 30° of dorsal angulation at this time

## 2019-08-11 PROBLEM — IMO0002 ELEVATION OF LEVEL OF TRANSAMINASE OR LACTIC ACID DEHYDROGENASE (LDH): Status: ACTIVE | Noted: 2017-08-02

## 2019-08-14 ENCOUNTER — OFFICE VISIT (OUTPATIENT)
Dept: FAMILY MEDICINE CLINIC | Facility: CLINIC | Age: 39
End: 2019-08-14
Payer: OTHER MISCELLANEOUS

## 2019-08-14 VITALS
WEIGHT: 181 LBS | HEIGHT: 70 IN | HEART RATE: 80 BPM | TEMPERATURE: 98.1 F | BODY MASS INDEX: 25.91 KG/M2 | RESPIRATION RATE: 16 BRPM | DIASTOLIC BLOOD PRESSURE: 70 MMHG | SYSTOLIC BLOOD PRESSURE: 100 MMHG

## 2019-08-14 DIAGNOSIS — S52.501S CLOSED FRACTURE OF DISTAL ENDS OF RIGHT RADIUS AND ULNA, SEQUELA: Primary | ICD-10-CM

## 2019-08-14 DIAGNOSIS — S52.601S CLOSED FRACTURE OF DISTAL ENDS OF RIGHT RADIUS AND ULNA, SEQUELA: Primary | ICD-10-CM

## 2019-08-14 DIAGNOSIS — K59.09 CHRONIC CONSTIPATION: ICD-10-CM

## 2019-08-14 DIAGNOSIS — K21.9 GASTROESOPHAGEAL REFLUX DISEASE, ESOPHAGITIS PRESENCE NOT SPECIFIED: ICD-10-CM

## 2019-08-14 PROCEDURE — 99243 OFF/OP CNSLTJ NEW/EST LOW 30: CPT | Performed by: FAMILY MEDICINE

## 2019-08-14 RX ORDER — OMEPRAZOLE 10 MG/1
10 CAPSULE, DELAYED RELEASE ORAL DAILY
COMMUNITY
End: 2020-12-28

## 2019-08-14 NOTE — PROGRESS NOTES
Assessment/Plan:    No problem-specific Assessment & Plan notes found for this encounter  Medically cleared for surgery     Diagnoses and all orders for this visit:    Closed fracture of distal ends of right radius and ulna, sequela    Chronic constipation    Gastroesophageal reflux disease, esophagitis presence not specified    Other orders  -     Polyethylene Glycol 3350 (DULCOLAX BALANCE PO); Take by mouth  -     omeprazole (PriLOSEC) 10 mg delayed release capsule; Take 10 mg by mouth daily Take 1 pill 5 days a week      per EMR records, he has a PCP in [facility], Dr Christie Reed        No follow-ups on file  Subjective:      Patient ID: Jf Barcenas is a 34 y o  male  Chief Complaint   Patient presents with    preop clearence     wmcma       HPI  Planned procedure: right wrist ORIF, workers comp  Surgeon: Dr Davi Aldridge  Date: 8/21/19  Anesthesia type: unknown    Prior major surgeries: none  Problems with anesthesia in past: n    Can walk 4 blocks or 2 flights of stairs: y  History of excessive bleeding: n  History of excessive clotting: n  Personal history of DVT or Pe: n    Taking NSAIDS: none except prn  Takings vitamins D or E or A or fish oil supplements: n    Usual am medications: dulocolax prn    Bmp ok, nonfasting, slight low K at 3 4  CBC normal  coags normal    The following portions of the patient's history were reviewed and updated as appropriate: allergies, current medications, past family history, past medical history, past social history, past surgical history and problem list     Review of Systems   Constitutional: Negative for chills and fever  Respiratory: Negative for shortness of breath  Cardiovascular: Negative for chest pain           Current Outpatient Medications   Medication Sig Dispense Refill    omeprazole (PriLOSEC) 10 mg delayed release capsule Take 10 mg by mouth daily Take 1 pill 5 days a week      Polyethylene Glycol 3350 (DULCOLAX BALANCE PO) Take by mouth       No current facility-administered medications for this visit  Objective:    /70   Pulse 80   Temp 98 1 °F (36 7 °C)   Resp 16   Ht 5' 10" (1 778 m)   Wt 82 1 kg (181 lb)   BMI 25 97 kg/m²        Physical Exam   Constitutional: He appears well-developed  No distress  HENT:   Head: Normocephalic  Mouth/Throat: No oropharyngeal exudate  Eyes: Conjunctivae are normal  No scleral icterus  Neck: Neck supple  Cardiovascular: Normal rate, normal heart sounds and intact distal pulses  No murmur heard  Pulmonary/Chest: Effort normal  No respiratory distress  Abdominal: Soft  Bowel sounds are normal    Musculoskeletal: He exhibits tenderness  He exhibits no edema or deformity  Right wrist pain with rom, neurovasc intact   Neurological: He is alert  Skin: Skin is warm and dry  No pallor  Psychiatric: His behavior is normal  Thought content normal    Nursing note and vitals reviewed               Tonya Stark DO

## 2019-08-16 NOTE — PRE-PROCEDURE INSTRUCTIONS
Pre-Surgery Instructions:   Medication Instructions    omeprazole (PriLOSEC) 10 mg delayed release capsule Instructed patient per Anesthesia Guidelines   Polyethylene Glycol 3350 (DULCOLAX BALANCE PO) Patient was instructed by Physician and understands  Pre op instructions reviewed with pt via phone  Instructed to take prilosec a m of surgery   step-dad Leandra Sher, to provide mobile # on arrival  My Surgical Experience    The following information was developed to assist you to prepare for your operation  What do I need to do before coming to the hospital?   Arrange for a responsible person to drive you to and from the hospital    Arrange care for your children at home  Children are not allowed in the recovery areas of the hospital   Plan to wear clothing that is easy to put on and take off  If you are having shoulder surgery, wear a shirt that buttons or zippers in the front  Bathing  o Shower the evening before and the morning of your surgery with an antibacterial soap  Please refer to the Pre Op Showering Instructions for Surgery Patients Sheet   o Remove nail polish and all body piercing jewelry  o Do not shave any body part for at least 24 hours before surgery-this includes face, arms, legs and upper body  Food  o Nothing to eat or drink after midnight the night before your surgery  This includes candy and chewing gum  o Exception: If your surgery is after 12:00pm (noon), you may have clear liquids such as 7-Up®, ginger ale, apple or cranberry juice, Jell-O®, water, or clear broth until 8:00 am  o Do not drink milk or juice with pulp on the morning before surgery  o Do not drink alcohol 24 hours before surgery  Medicine  o Follow instructions you received from your surgeon about which medicines you may take on the day of surgery  o If instructed to take medicine on the morning of surgery, take pills with just a small sip of water   Call your prescribing doctor for specific information on what to do if you take insulin    What should I bring to the hospital?    Bring:  Herminia Shelling or a walker, if you have them, for foot or knee surgery   A list of the daily medicines, vitamins, minerals, herbals and nutritional supplements you take  Include the dosages of medicines and the time you take them each day   Glasses, dentures or hearing aids   Minimal clothing; you will be wearing hospital sleepwear   Photo ID; required to verify your identity   If you have a Living Will or Power of , bring a copy of the documents   If you have an ostomy, bring an extra pouch and any supplies you use    Do not bring   Medicines or inhalers   Money, valuables or jewelry    What other information should I know about the day of surgery?  Notify your surgeons if you develop a cold, sore throat, cough, fever, rash or any other illness   Report to the Ambulatory Surgical/Same Day Surgery Unit   You will be instructed to stop at Registration only if you have not been pre-registered   Inform your  fi they do not stay that they will be asked by the staff to leave a phone number where they can be reached   Be available to be reached before surgery  In the event the operating room schedule changes, you may be asked to come in earlier or later than expected    *It is important to tell your doctor and others involved in your health care if you are taking or have been taking any non-prescription drugs, vitamins, minerals, herbals or other nutritional supplements   Any of these may interact with some food or medicines and cause a reaction

## 2019-08-20 ENCOUNTER — ANESTHESIA EVENT (OUTPATIENT)
Dept: PERIOP | Facility: HOSPITAL | Age: 39
End: 2019-08-20
Payer: OTHER MISCELLANEOUS

## 2019-08-21 ENCOUNTER — APPOINTMENT (OUTPATIENT)
Dept: RADIOLOGY | Facility: HOSPITAL | Age: 39
End: 2019-08-21
Payer: OTHER MISCELLANEOUS

## 2019-08-21 ENCOUNTER — HOSPITAL ENCOUNTER (OUTPATIENT)
Facility: HOSPITAL | Age: 39
Setting detail: OUTPATIENT SURGERY
Discharge: HOME | End: 2019-08-21
Attending: ORTHOPAEDIC SURGERY | Admitting: ORTHOPAEDIC SURGERY
Payer: OTHER MISCELLANEOUS

## 2019-08-21 ENCOUNTER — ANESTHESIA (OUTPATIENT)
Dept: PERIOP | Facility: HOSPITAL | Age: 39
End: 2019-08-21
Payer: OTHER MISCELLANEOUS

## 2019-08-21 VITALS
TEMPERATURE: 97 F | OXYGEN SATURATION: 95 % | RESPIRATION RATE: 18 BRPM | HEART RATE: 76 BPM | SYSTOLIC BLOOD PRESSURE: 132 MMHG | WEIGHT: 182.5 LBS | HEIGHT: 70 IN | DIASTOLIC BLOOD PRESSURE: 65 MMHG | BODY MASS INDEX: 26.13 KG/M2

## 2019-08-21 DIAGNOSIS — S52.601A CLOSED FRACTURE OF DISTAL ENDS OF RIGHT RADIUS AND ULNA, INITIAL ENCOUNTER: Primary | ICD-10-CM

## 2019-08-21 DIAGNOSIS — S52.501A CLOSED FRACTURE OF DISTAL ENDS OF RIGHT RADIUS AND ULNA, INITIAL ENCOUNTER: Primary | ICD-10-CM

## 2019-08-21 PROCEDURE — C1713 ANCHOR/SCREW BN/BN,TIS/BN: HCPCS | Performed by: ORTHOPAEDIC SURGERY

## 2019-08-21 PROCEDURE — 25607 OPTX DST RD XARTC FX/EPI SEP: CPT | Performed by: ORTHOPAEDIC SURGERY

## 2019-08-21 PROCEDURE — 73100 X-RAY EXAM OF WRIST: CPT

## 2019-08-21 DEVICE — 2.4MM CORTEX SCREW SLF-TPNG WITH T8 STARDRIVE RECESS 14MM: Type: IMPLANTABLE DEVICE | Site: WRIST | Status: FUNCTIONAL

## 2019-08-21 DEVICE — 2.4MM VA LOCKING SCREW STARDRIVE 16MM: Type: IMPLANTABLE DEVICE | Site: WRIST | Status: FUNCTIONAL

## 2019-08-21 DEVICE — 2.4MM VA-LCP 2-CLMN VLR DSTL RADIUS PL 6H HD/4H SHAFT/RIGHT
Type: IMPLANTABLE DEVICE | Site: WRIST | Status: FUNCTIONAL
Brand: VA-LCP

## 2019-08-21 DEVICE — 2.4MM VA LOCKING SCREW STARDRIVE 20MM: Type: IMPLANTABLE DEVICE | Site: WRIST | Status: FUNCTIONAL

## 2019-08-21 DEVICE — 2.7MM CORTEX SCREW SLF-TPNG WITH T8 STARDRIVE RECESS 14MM: Type: IMPLANTABLE DEVICE | Site: WRIST | Status: FUNCTIONAL

## 2019-08-21 DEVICE — 2.4MM VA LOCKING SCREW STARDRIVE 18MM: Type: IMPLANTABLE DEVICE | Site: WRIST | Status: FUNCTIONAL

## 2019-08-21 DEVICE — 2.7MM CORTEX SCREW SLF-TPNG WITH T8 STARDRIVE RECESS 16MM: Type: IMPLANTABLE DEVICE | Site: WRIST | Status: FUNCTIONAL

## 2019-08-21 RX ORDER — PROPOFOL 10 MG/ML
INJECTION, EMULSION INTRAVENOUS AS NEEDED
Status: DISCONTINUED | OUTPATIENT
Start: 2019-08-21 | End: 2019-08-21 | Stop reason: SURG

## 2019-08-21 RX ORDER — OXYCODONE HYDROCHLORIDE 5 MG/1
5 TABLET ORAL EVERY 4 HOURS PRN
Qty: 30 TABLET | Refills: 0 | Status: SHIPPED | OUTPATIENT
Start: 2019-08-21 | End: 2019-08-31

## 2019-08-21 RX ORDER — DEXAMETHASONE SODIUM PHOSPHATE 10 MG/ML
INJECTION, SOLUTION INTRAMUSCULAR; INTRAVENOUS AS NEEDED
Status: DISCONTINUED | OUTPATIENT
Start: 2019-08-21 | End: 2019-08-21 | Stop reason: SURG

## 2019-08-21 RX ORDER — SODIUM CHLORIDE, SODIUM LACTATE, POTASSIUM CHLORIDE, CALCIUM CHLORIDE 600; 310; 30; 20 MG/100ML; MG/100ML; MG/100ML; MG/100ML
125 INJECTION, SOLUTION INTRAVENOUS CONTINUOUS
Status: DISCONTINUED | OUTPATIENT
Start: 2019-08-21 | End: 2019-08-21 | Stop reason: HOSPADM

## 2019-08-21 RX ORDER — FENTANYL CITRATE/PF 50 MCG/ML
25 SYRINGE (ML) INJECTION
Status: DISCONTINUED | OUTPATIENT
Start: 2019-08-21 | End: 2019-08-21 | Stop reason: HOSPADM

## 2019-08-21 RX ORDER — CEFAZOLIN SODIUM 2 G/50ML
2000 SOLUTION INTRAVENOUS ONCE
Status: COMPLETED | OUTPATIENT
Start: 2019-08-21 | End: 2019-08-21

## 2019-08-21 RX ORDER — FENTANYL CITRATE 50 UG/ML
INJECTION, SOLUTION INTRAMUSCULAR; INTRAVENOUS AS NEEDED
Status: DISCONTINUED | OUTPATIENT
Start: 2019-08-21 | End: 2019-08-21 | Stop reason: SURG

## 2019-08-21 RX ORDER — MIDAZOLAM HYDROCHLORIDE 1 MG/ML
INJECTION INTRAMUSCULAR; INTRAVENOUS AS NEEDED
Status: DISCONTINUED | OUTPATIENT
Start: 2019-08-21 | End: 2019-08-21 | Stop reason: SURG

## 2019-08-21 RX ORDER — ONDANSETRON 2 MG/ML
INJECTION INTRAMUSCULAR; INTRAVENOUS AS NEEDED
Status: DISCONTINUED | OUTPATIENT
Start: 2019-08-21 | End: 2019-08-21 | Stop reason: SURG

## 2019-08-21 RX ORDER — MAGNESIUM HYDROXIDE 1200 MG/15ML
LIQUID ORAL AS NEEDED
Status: DISCONTINUED | OUTPATIENT
Start: 2019-08-21 | End: 2019-08-21 | Stop reason: HOSPADM

## 2019-08-21 RX ORDER — ONDANSETRON 2 MG/ML
4 INJECTION INTRAMUSCULAR; INTRAVENOUS ONCE AS NEEDED
Status: COMPLETED | OUTPATIENT
Start: 2019-08-21 | End: 2019-08-21

## 2019-08-21 RX ORDER — MEPERIDINE HYDROCHLORIDE 25 MG/ML
12.5 INJECTION INTRAMUSCULAR; INTRAVENOUS; SUBCUTANEOUS
Status: DISCONTINUED | OUTPATIENT
Start: 2019-08-21 | End: 2019-08-21 | Stop reason: HOSPADM

## 2019-08-21 RX ORDER — LIDOCAINE HYDROCHLORIDE 10 MG/ML
INJECTION, SOLUTION INFILTRATION; PERINEURAL AS NEEDED
Status: DISCONTINUED | OUTPATIENT
Start: 2019-08-21 | End: 2019-08-21 | Stop reason: SURG

## 2019-08-21 RX ADMIN — PROPOFOL 200 MG: 10 INJECTION, EMULSION INTRAVENOUS at 13:42

## 2019-08-21 RX ADMIN — CEFAZOLIN SODIUM 2000 MG: 2 SOLUTION INTRAVENOUS at 13:44

## 2019-08-21 RX ADMIN — FENTANYL CITRATE 25 MCG: 50 INJECTION, SOLUTION INTRAMUSCULAR; INTRAVENOUS at 15:48

## 2019-08-21 RX ADMIN — ONDANSETRON 4 MG: 2 INJECTION INTRAMUSCULAR; INTRAVENOUS at 16:55

## 2019-08-21 RX ADMIN — LIDOCAINE HYDROCHLORIDE 50 MG: 10 INJECTION, SOLUTION INFILTRATION; PERINEURAL at 13:42

## 2019-08-21 RX ADMIN — FENTANYL CITRATE 25 MCG: 50 INJECTION, SOLUTION INTRAMUSCULAR; INTRAVENOUS at 15:44

## 2019-08-21 RX ADMIN — FENTANYL CITRATE 25 MCG: 50 INJECTION, SOLUTION INTRAMUSCULAR; INTRAVENOUS at 15:56

## 2019-08-21 RX ADMIN — DEXAMETHASONE SODIUM PHOSPHATE 4 MG: 10 INJECTION, SOLUTION INTRAMUSCULAR; INTRAVENOUS at 13:56

## 2019-08-21 RX ADMIN — FENTANYL CITRATE 50 MCG: 50 INJECTION, SOLUTION INTRAMUSCULAR; INTRAVENOUS at 13:58

## 2019-08-21 RX ADMIN — FENTANYL CITRATE 25 MCG: 50 INJECTION, SOLUTION INTRAMUSCULAR; INTRAVENOUS at 15:46

## 2019-08-21 RX ADMIN — ONDANSETRON 4 MG: 2 INJECTION INTRAMUSCULAR; INTRAVENOUS at 13:56

## 2019-08-21 RX ADMIN — SODIUM CHLORIDE, SODIUM LACTATE, POTASSIUM CHLORIDE, AND CALCIUM CHLORIDE 125 ML/HR: .6; .31; .03; .02 INJECTION, SOLUTION INTRAVENOUS at 09:54

## 2019-08-21 RX ADMIN — FENTANYL CITRATE 50 MCG: 50 INJECTION, SOLUTION INTRAMUSCULAR; INTRAVENOUS at 13:42

## 2019-08-21 RX ADMIN — MIDAZOLAM HYDROCHLORIDE 2 MG: 1 INJECTION, SOLUTION INTRAMUSCULAR; INTRAVENOUS at 13:38

## 2019-08-21 NOTE — INTERVAL H&P NOTE
H&P reviewed  After examining the patient I find no changes in the patients condition since the H&P had been written      Vitals:    08/21/19 0933   BP: 118/69   Pulse: 62   Resp: 18   Temp: 99 1 °F (37 3 °C)   SpO2: 97%

## 2019-08-21 NOTE — DISCHARGE INSTRUCTIONS
Dr Parul Mcgowan Operative Instructions  ORIF Distal Radius    You have had surgery on your arm today, please read and follow the information below:  · Elevate your hand above your elbow during the next 24-48 hours to help with swelling  · Place your hand and arm over your head with motion at your shoulder three times a day  · Do not apply any cream/ointment/oil to your incisions including antibiotics  · Do not soak your hands in standing water (dishwater, tubs, Jacuzzi's, pools, etc ) until given permission (typically 2-3 weeks after injury)    Call the office if you notice any:  · Increased numbness or tingling of your hand or fingers that is not relieved with elevation  · Increasing pain that is not controlled with medication  · Difficulty chewing, breathing, swallowing  · Chest pains or shortness of breath  · Fever over 101 4 degrees  Bandage: Do NOT remove bandage until follow-up appointment  Motion: Move fingers into a fist 5 times a day, DO NOT move any splinted fingers  Weight bearing status: The operated extremity should be non-weight bearing until further notice  Ice: Ice for 10 minutes every hour as needed for swelling x 24 hours  Sling: Sling for comfort for 2-3 days  Pain medication:   Oxycodone 1 tab every 6 hours AS NEEDED for pain     Follow-up Appointment: 10-14 days  Please call the office at [phone] if you have any questions or concerns regarding your post-operative care

## 2019-08-21 NOTE — ANESTHESIA PREPROCEDURE EVALUATION
Review of Systems/Medical History  Patient summary reviewed  Chart reviewed      Cardiovascular  Negative cardio ROS    Pulmonary       GI/Hepatic    GERD ,        Negative  ROS        Endo/Other  Negative endo/other ROS      GYN  Negative gynecology ROS          Hematology  Negative hematology ROS      Musculoskeletal  Negative musculoskeletal ROS        Neurology  Negative neurology ROS      Psychology   Negative psychology ROS              Physical Exam    Airway       Dental       Cardiovascular  Comment: Negative ROS,     Pulmonary      Other Findings        Anesthesia Plan  ASA Score- 2     Anesthesia Type- general with ASA Monitors  Additional Monitors:   Airway Plan: LMA  Plan Factors-    Induction- intravenous  Postoperative Plan- Plan for postoperative opioid use       Informed Consent-

## 2019-08-21 NOTE — ANESTHESIA POSTPROCEDURE EVALUATION
Post-Op Assessment Note    CV Status:  Stable  Pain Score: 1    Pain management: adequate     Mental Status:  Agitated   Hydration Status:  Stable   PONV Controlled:  None   Airway Patency:  Patent  Airway: intubated   Post Op Vitals Reviewed: Yes      Staff: Anesthesiologist           /76 (08/21/19 1645)    Temp     Pulse 78 (08/21/19 1645)   Resp 18 (08/21/19 1645)    SpO2 95 % (08/21/19 1645)

## 2019-08-22 NOTE — OP NOTE
OPERATIVE REPORT  PATIENT NAME: Altagracia Oneill    :  1980  MRN: [id number]  Pt Location: [facility] OR ROOM 02    SURGERY DATE: 2019    Surgeon(s) and Role:     * Betsy Sanchez, DO - Primary    Preop Diagnosis:  Closed fracture of distal ends of right radius and ulna with routine healing, subsequent encounter [S52 501D, S52 601D]    Post-Op Diagnosis Codes:     * Closed fracture of distal ends of right radius and ulna with routine healing, subsequent encounter [S52 501D, S52 601D]    Procedure(s) (LRB):  OPEN REDUCTION W/ INTERNAL FIXATION (ORIF) RADIUS / ULNA (WRIST) (Right)    Specimen(s):  * No specimens in log *    Estimated Blood Loss:   Minimal    Drains:  * No LDAs found *    Anesthesia Type:   General    Operative Indications:  Closed fracture of distal ends of right radius and ulna with routine healing, subsequent encounter [S52 501D, S52 601D]      Operative Findings:  Near-fully healed DR fx with dorsal angulation    Complications:   None    Procedure and Technique:  Patient is a 1year-old male presented the office approximately 3 weeks ago with a distal radius fracture right  Is anatomically aligned  We had discussion of both surgical nonsurgical treatment  Patient wanted to avoid surgery elected to undergo non operative treatment with a cast   The patient was placed in a cast and instructed to follow-up  Patient did have serial x-rays which demonstrated fracture did not move  Patient was seen most recently and he did report that he fell asleep with his arm elevated in the cast had fallen off somehow  X-rays were obtained which demonstrated movement of the fracture fragments with dorsal angulation of the distal fragment at this point  It was dorsally angulated approximately 25°  I explained to the patient that if this were not to heal this way that he may have problems with his DRUJ in the future as well as arthritis in the carpal bones  Surgical versus nonsurgical options were discussed    Risks and benefits were discussed  Patient elected to undergo open reduction internal fixation of the distal radius the operating room  Consent forms were obtained  The day of surgery patient was identified by 1st last name  The right upper extremity was marked  Patient with the anesthesia team they do that LMA plus regional the most appropriate  Consent forms were obtained for this  Patient was transferred from the preop area to the block room underwent axillary block without complication  Patient was then transferred from the block room to the OR underwent LMA intubation with general anesthesia without complication  Well-padded tourniquet was placed on the arm  Patient then underwent sterile prep and drape  Time-out was performed successfully  Everyone in the room was in agreement  Antibiotics had been given  I reviewed the consent form myself  X-rays were obtained prior to surgery in order to confirm operative site  X-rays were done of the right wrist which demonstrated the distal radius fracture  Limb was exsanguinated using Esmarch bandage the tourniquet was elevated 250 mmHg  Attention was then turned to the volar aspect of the wrist   A 6 cm incision was made superficial the FCR tendon on the skin extending from the wrist crease proximally  Dissection was carefully carried down through skin and subcutaneous tissue  We encountered the FCR tendon  Sheath was opened 15 blade and then was carefully opened proximally distally with the tenotomy scissors bluntly  His open distally to the scaphoid tubercle per proximally to the FCR muscle belly  It was then retracted radially  This exposed the FCR sub sheath  The FCR sub sheath was opened with a 15 blade  This revealed the FPL  This was retracted ulnarly  Then this revealed the pronator quadratus  Pronator quadratus was elevated off the distal radius and hockey-stick type incision distally and radially    It was then elevated further with a key elevator  This revealed the fracture  It was nearly fully healed  At this point under fluoroscopic guidance a Synthes volar locking plate was placed over the distal radius and maintained with K-wires  After we placed the plate appropriately which was confirmed with fluoroscopy this holes were drilled in the distal fragment  We then removed the plate  We then took down any bony union at the fracture site is completely remove the combination of rongeur and osteotomes  After we fully mobilized the distal fragment we then replaced the plate distally through the prior K-wire holes  This was confirmed under fluoroscopic guidance  We then placed locking screws distally within the plate  They were placed into the distal fragment  We there were then able to mobilize the distal fragment in order to bring into a volar tilt  It was brought into neutral volar tilt  Was brought appropriately out to length  We proximally then placed cortical screws within the shaft order for a had volar tilt  After placed screws distally and proximally final fluoroscopic views were taken  These demonstrated anatomic reduction of the fracture with appropriate volar tilt  The wrist was taken through range of motion is full range of motion without crepitus  There are no prominent screws  There DRUJ was stable to pronation supination  There is full range of motion rotation  After we were finished with placing the hardware the tourniquet was released  All bleeding was stopped with bipolar cautery direct pressure  The wound was thoroughly irrigated normal saline solution  The wound was then closed with 4 0 Vicryl suture as was 5 over Monocryl suture in running fashion  Steri-Strips were then applied  Patient was placed in a sterile dressing and a volar plaster splint  Patient tolerated surgery well  He was transferred to the OR PACU stable condition after was woken from general anesthesia     I was present for the entire procedure and A qualified resident physician was not available    Patient Disposition:  PACU  and hemodynamically stable    SIGNATURE: Abhishek Soto DO  DATE: August 21, 2019  TIME: 9:55 PM

## 2019-09-03 ENCOUNTER — APPOINTMENT (OUTPATIENT)
Dept: RADIOLOGY | Facility: CLINIC | Age: 39
End: 2019-09-03
Payer: OTHER MISCELLANEOUS

## 2019-09-03 ENCOUNTER — OFFICE VISIT (OUTPATIENT)
Dept: OBGYN CLINIC | Facility: CLINIC | Age: 39
End: 2019-09-03

## 2019-09-03 VITALS — SYSTOLIC BLOOD PRESSURE: 123 MMHG | HEART RATE: 72 BPM | DIASTOLIC BLOOD PRESSURE: 81 MMHG

## 2019-09-03 DIAGNOSIS — S52.601D CLOSED FRACTURE OF DISTAL ENDS OF RIGHT RADIUS AND ULNA WITH ROUTINE HEALING, SUBSEQUENT ENCOUNTER: ICD-10-CM

## 2019-09-03 DIAGNOSIS — S52.501D CLOSED FRACTURE OF DISTAL ENDS OF RIGHT RADIUS AND ULNA WITH ROUTINE HEALING, SUBSEQUENT ENCOUNTER: ICD-10-CM

## 2019-09-03 DIAGNOSIS — S52.501D CLOSED FRACTURE OF DISTAL ENDS OF RIGHT RADIUS AND ULNA WITH ROUTINE HEALING, SUBSEQUENT ENCOUNTER: Primary | ICD-10-CM

## 2019-09-03 DIAGNOSIS — S52.601D CLOSED FRACTURE OF DISTAL ENDS OF RIGHT RADIUS AND ULNA WITH ROUTINE HEALING, SUBSEQUENT ENCOUNTER: Primary | ICD-10-CM

## 2019-09-03 PROCEDURE — 73100 X-RAY EXAM OF WRIST: CPT

## 2019-09-03 PROCEDURE — 99024 POSTOP FOLLOW-UP VISIT: CPT | Performed by: ORTHOPAEDIC SURGERY

## 2019-09-03 NOTE — PROGRESS NOTES
Assessment/Plan:  1  Closed fracture of distal ends of right radius and ulna with routine healing, subsequent encounter  Ambulatory referral to PT/OT hand therapy    CANCELED: XR wrist 3+ vw right       Scribe Attestation    I,:   Eve Robertson MA am acting as a scribe while in the presence of the attending physician :        I,:   Nancyann Severance, DO personally performed the services described in this documentation    as scribed in my presence :              Charan Romero is doing well  His incision is well healed  There is no erythema or signs of infection  X-rays demonstrate anatomic alignment and stable hardware  He was instructed to return to to the use of the cock-up wrist brace  A referral was provided to occupational therapy for range of motion  He will remain nonweightbearing with the RUE  He may return to work on light duty with no use of the RUE and a note was provided for this today  He will follow up in 4 weeks for repeat evaluation and repeat x-ray  Subjective:   Suzi Holcomb is a 34 y o  male who presents to the office today for follow up evaluation 2 weeks s/p ORIF right distal radius performed on 8/21/19  Patient states he is doing well postoperatively  He denies any pain  He denies any numbness or tingling  Review of Systems   Constitutional: Negative for chills and fever  HENT: Negative for drooling and sneezing  Eyes: Negative for redness  Respiratory: Negative for cough and wheezing  Gastrointestinal: Negative for nausea and vomiting  Musculoskeletal: Negative for arthralgias, joint swelling and myalgias  Neurological: Negative for weakness and numbness  Psychiatric/Behavioral: Negative for behavioral problems  The patient is not nervous/anxious            Past Medical History:   Diagnosis Date    GERD (gastroesophageal reflux disease)     Radius/ulna fracture 08/2019    Right    Small bowel obstruction [facility])        Past Surgical History:   Procedure Laterality Date    PA OPEN RX DISTAL RADIUS FX, EXTRA-ARTICULAR Right 8/21/2019    Procedure: OPEN REDUCTION W/ INTERNAL FIXATION (ORIF) RADIUS / ULNA (WRIST); Surgeon: Marcial Marcelino DO;  Location: [facility] OR;  Service: Orthopedics    WISDOM TOOTH EXTRACTION         Family History   Problem Relation Age of Onset    Arthritis Mother     Asthma Sister     No Known Problems Son        Social History     Occupational History    Not on file   Tobacco Use    Smoking status: Never Smoker    Smokeless tobacco: Never Used   Substance and Sexual Activity    Alcohol use: Yes     Frequency: 2-4 times a month     Comment: rare    Drug use: No    Sexual activity: Not on file         Current Outpatient Medications:     omeprazole (PriLOSEC) 10 mg delayed release capsule, Take 10 mg by mouth daily Take 1 pill 5 days a week, Disp: , Rfl:     Polyethylene Glycol 3350 (DULCOLAX BALANCE PO), Take by mouth 3 (three) times a week , Disp: , Rfl:     No Known Allergies    Objective:  Vitals:    09/03/19 1034   BP: 123/81   Pulse: 72       Ortho Exam     Right wrist    Incision well healed  No erythema or signs of infection  Ext 40  Flex 50  Ulnar and radial deviation 10  Sup 75  Pro 70  Full fist  EPL FPL intact  Compartments soft  Brisk capillary refill  S/m intact median, radial, and ulnar nerve     Physical Exam   Constitutional: He is oriented to person, place, and time  He appears well-developed and well-nourished  HENT:   Head: Normocephalic and atraumatic  Eyes: Conjunctivae are normal  Right eye exhibits no discharge  Left eye exhibits no discharge  Neck: Normal range of motion  Neck supple  Cardiovascular: Normal rate and intact distal pulses  Pulmonary/Chest: Effort normal  No respiratory distress  Musculoskeletal:   As noted in HPI   Neurological: He is alert and oriented to person, place, and time  Skin: Skin is warm and dry  Psychiatric: He has a normal mood and affect   His behavior is normal  Judgment and thought content normal        I have personally reviewed pertinent films in PACS and my interpretation is as follows:X-ray right wrist performed in the office today demonstrates anatomic alignment right distal radius and stable hardware

## 2019-09-03 NOTE — LETTER
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

## 2019-09-11 ENCOUNTER — EVALUATION (OUTPATIENT)
Dept: OCCUPATIONAL THERAPY | Facility: CLINIC | Age: 39
End: 2019-09-11
Payer: OTHER MISCELLANEOUS

## 2019-09-11 DIAGNOSIS — S52.501D CLOSED FRACTURE OF DISTAL ENDS OF RIGHT RADIUS AND ULNA WITH ROUTINE HEALING, SUBSEQUENT ENCOUNTER: Primary | ICD-10-CM

## 2019-09-11 DIAGNOSIS — S52.601D CLOSED FRACTURE OF DISTAL ENDS OF RIGHT RADIUS AND ULNA WITH ROUTINE HEALING, SUBSEQUENT ENCOUNTER: Primary | ICD-10-CM

## 2019-09-11 PROCEDURE — 97165 OT EVAL LOW COMPLEX 30 MIN: CPT | Performed by: OCCUPATIONAL THERAPIST

## 2019-09-11 NOTE — PROGRESS NOTES
OT Evaluation     Today's date: 2019  Patient name: Jimi Aguayo  : 1980  MRN: [id number]  Referring provider: Vikas Scott DO  Dx:   Encounter Diagnosis     ICD-10-CM    1  Closed fracture of distal ends of right radius and ulna with routine healing, subsequent encounter S52 501D Ambulatory referral to PT/OT hand therapy    S52 238N        Start Time: 115  Stop Time: 145  Total time in clinic (min): 30 minutes    Assessment  Assessment details: CASE SUMMARY:   Jimi Aguayo is a 37y o  year old male who suffered a R wrist fracture after falling while strapping down his load on his truck on 19  The wrist became unstable in the loose cast and so required a surgery for ORIF on 19  PMHx includes: See chart for full details with medications  He is  from wife and lives with his sister currently  He works as a    FUNCTIONAL SUMMARY:   Jimi Aguayo is independent in basic self care skills  He has difficulty with lifting, cooking, cleaning and work tasks  FOTO score is 47% with a 68% prediction in function  IMPAIRMENTS:  Jimi Aguayo presents with:  Healed incision with no  adhesion,   Increased edema in the R wrist,   Decreased ROM in the R wrist,   Decreased wrist and hand strength,   Intact sensation,   Pain level at 0-3/10   Difficulty with ADLs and work tasks   Patient would benefit from Occupational Therapy to address these impairments in order to return to His prior level of function  Understanding of Dx/Px/POC: good   Prognosis: good    Goals  Short Term Goals:   to be completed in 6-8 weeks  1  Decrease edema of R wrist through manual lymphatic drainage massage, tubigrip stockinette, and /or kinesiotape ,   2  Increase ROM of R wrist to WNLs, through the use of heat modalities, manual therapy with Graston techniques, PROM, joint mobilizations, AROM exercises, flexion taping and or splinting as needed     3  Increase  wrist strength to 5/5 and hand strength right =50% of unaffected side  4  Decrease pain to 0-2/10  Long Term Goals: To be completed in 8-10 weeks  1 Ability to perform lifting, carrying, cooking,cleaning tasks and work tasks with minimal to no discomfort,   2  Patient demonstrates good carryover of HEP,   3  Minimal to no pain complaints  0-2/10,   4  Full ROM of R wrist    5  Improved wrist strength to 5/5 and hand strength  right =75% of unaffected side  Plan  Patient would benefit from: skilled occupational therapy  Planned modality interventions: thermotherapy: hydrocollator packs and ultrasound  Planned therapy interventions: joint mobilization, manual therapy, massage, strengthening, stretching, therapeutic activities, therapeutic exercise, functional ROM exercises, flexibility and home exercise program  Frequency: 2x week  Duration in weeks: 12  Plan of Care beginning date: 2019  Plan of Care expiration date: 2019        Subjective Evaluation    Pain  Current pain ratin  At best pain ratin  At worst pain rating: 3  Location: In wrist   Quality: dull ache    Hand dominance: right    Treatments  Previous treatment: immobilization  Current treatment: occupational therapy  Patient Goals  Patient goals for therapy: decreased edema, decreased pain, increased motion, increased strength, independence with ADLs/IADLs and return to work          Objective     Observations     Additional Observation Details  Volar scar - no adhesions    Neurological Testing     Sensation     Wrist/Hand     Right   Intact: light touch    Additional Neurological Details  No numbness reported      Active Range of Motion     Right Elbow   Forearm supination: 45 degrees   Forearm pronation: 60 degrees     Right Wrist   Wrist flexion: 30 degrees   Wrist extension: 40 degrees   Radial deviation: 14 degrees   Ulnar deviation: 32 degrees     Strength/Myotome Testing     Left Wrist/Hand   Wrist extension: 4  Wrist flexion: 4     (2nd hand position)     Trial 1: 80    Thumb Strength  Key/Lateral Pinch     Trail 1: 17  Tip/Two-Point Pinch     Trial 1: 9  Palmar/Three-Point Pinch     Trial 1: 6    Right Wrist/Hand      (2nd hand position)     Trial 1: 15    Thumb Strength   Key/Lateral Pinch     Trial 1: 21  Tip/Two-Point Pinch     Trial 1: 15 5  Palmar/Three-Point Pinch     Trial 1: 17    Swelling     Left Wrist/Hand   Circumference wrist: 18 6 cm    Right Wrist/Hand   Circumference wrist: 17 1 cm             Treatment Today:     Subjective: Patient reports pain level as 0-3/10 today  Objective: Completed initial evaluation  See report for details  Completed manual therapy with PROM  myofascial soft tissue work and Graston techniques to forearm flexors and extensors and wrist joint to increase wrist ROM  Instructed in wrist ROM ex and gave light green grippers for finger ex  Set at light yellow and red elastics  To do x20 3x/day  Home Program:See Media Section for details  wrist ROM ex   light green gripper    Precautions: s/p ORIF 8/21/19    Assessment: Patient tolerated session well  ROM doing well for how early he is post sx  Plan: Continue Occupational Therapy ~2x/week to decrease pain and edema and improve ROM, strength and function

## 2019-09-16 ENCOUNTER — OFFICE VISIT (OUTPATIENT)
Dept: OCCUPATIONAL THERAPY | Facility: CLINIC | Age: 39
End: 2019-09-16
Payer: OTHER MISCELLANEOUS

## 2019-09-16 DIAGNOSIS — S52.501D CLOSED FRACTURE OF DISTAL ENDS OF RIGHT RADIUS AND ULNA WITH ROUTINE HEALING, SUBSEQUENT ENCOUNTER: Primary | ICD-10-CM

## 2019-09-16 DIAGNOSIS — S52.601D CLOSED FRACTURE OF DISTAL ENDS OF RIGHT RADIUS AND ULNA WITH ROUTINE HEALING, SUBSEQUENT ENCOUNTER: Primary | ICD-10-CM

## 2019-09-16 PROCEDURE — 97140 MANUAL THERAPY 1/> REGIONS: CPT | Performed by: OCCUPATIONAL THERAPIST

## 2019-09-16 PROCEDURE — 97110 THERAPEUTIC EXERCISES: CPT | Performed by: OCCUPATIONAL THERAPIST

## 2019-09-16 NOTE — PROGRESS NOTES
Daily Note     Today's date: 2019  Patient name: Monica Rodriguez  : 1980  MRN: [id number]  Referring provider: Saul Martinez DO  Dx:   Encounter Diagnosis     ICD-10-CM    1  Closed fracture of distal ends of right radius and ulna with routine healing, subsequent encounter S52 501D     S52 606M          Subjective: Patient reports pain level as 1/10 today  Objective:   Initiated treatment with double hot pack x ~5min for warm up to R wrist      Completed manual therapy with PROM  myofascial soft tissue work and Graston techniques to forearm flexors and extensors and wrist joint to increase wrist ROM  Completed  exercises and activities to increase ROM, strength, coordination and function  Home Program:See Media Section for details  wrist ROM ex   light green gripper    Precautions: s/p ORIF 19      Exercise Diary  19       Weight Well- no weight Pron/Sup  Reps x2        Wrist Roll  Flex/Ext  Pro/Sup   x5 each        Wrist maze   x5       Rubber Bands Red x10  Yellow x10       Clothespegs Light x20  Heavy x20                                                                                             Assessment: ROM doing well for how early he is post sx  Incorporated new exercises today, pt tolerated treatment well  Plan: Continue Occupational Therapy ~2x/week to decrease pain and edema and improve ROM, strength and function

## 2019-09-18 ENCOUNTER — OFFICE VISIT (OUTPATIENT)
Dept: OCCUPATIONAL THERAPY | Facility: CLINIC | Age: 39
End: 2019-09-18
Payer: OTHER MISCELLANEOUS

## 2019-09-18 DIAGNOSIS — S52.501D CLOSED FRACTURE OF DISTAL ENDS OF RIGHT RADIUS AND ULNA WITH ROUTINE HEALING, SUBSEQUENT ENCOUNTER: Primary | ICD-10-CM

## 2019-09-18 DIAGNOSIS — S52.601D CLOSED FRACTURE OF DISTAL ENDS OF RIGHT RADIUS AND ULNA WITH ROUTINE HEALING, SUBSEQUENT ENCOUNTER: Primary | ICD-10-CM

## 2019-09-18 PROCEDURE — 97110 THERAPEUTIC EXERCISES: CPT | Performed by: OCCUPATIONAL THERAPIST

## 2019-09-18 PROCEDURE — 97140 MANUAL THERAPY 1/> REGIONS: CPT | Performed by: OCCUPATIONAL THERAPIST

## 2019-09-23 ENCOUNTER — OFFICE VISIT (OUTPATIENT)
Dept: OCCUPATIONAL THERAPY | Facility: CLINIC | Age: 39
End: 2019-09-23
Payer: OTHER MISCELLANEOUS

## 2019-09-23 DIAGNOSIS — S52.601D CLOSED FRACTURE OF DISTAL ENDS OF RIGHT RADIUS AND ULNA WITH ROUTINE HEALING, SUBSEQUENT ENCOUNTER: Primary | ICD-10-CM

## 2019-09-23 DIAGNOSIS — S52.501D CLOSED FRACTURE OF DISTAL ENDS OF RIGHT RADIUS AND ULNA WITH ROUTINE HEALING, SUBSEQUENT ENCOUNTER: Primary | ICD-10-CM

## 2019-09-23 PROCEDURE — 97110 THERAPEUTIC EXERCISES: CPT | Performed by: OCCUPATIONAL THERAPIST

## 2019-09-23 PROCEDURE — 97140 MANUAL THERAPY 1/> REGIONS: CPT | Performed by: OCCUPATIONAL THERAPIST

## 2019-09-23 NOTE — PROGRESS NOTES
Daily Note     Today's date: 2019  Patient name: Walt León  : 1980  MRN: [id number]  Referring provider: Santosh Baig DO  Dx:   Encounter Diagnosis     ICD-10-CM    1  Closed fracture of distal ends of right radius and ulna with routine healing, subsequent encounter S52 501D     S58 850H          Subjective: Patient reports pain level as 0/10 unless stretching to end range  Objective:   Initiated treatment with double hot pack x ~5min for warm up to R wrist      Completed manual therapy with PROM  myofascial soft tissue work and Graston techniques to forearm flexors and extensors and wrist joint to increase wrist ROM  Completed  exercises and activities to increase ROM, strength, coordination and function  Home Program:See Media Section for details  wrist ROM ex   light green gripper    Precautions: s/p ORIF 19      Exercise Diary  19     Weight Well- no weight Pron/Sup  Reps x2  Pron/Sup  Reps x 3 cycles Pron/Sup  Reps x 3 cycles     Wrist Roll  Flex/Ext  Pro/Sup   x5 each        Wrist maze   x5 x5 x5     Rubber Bands Red x10  Yellow x10 Red x10  Yellow x10 Red x10  Yellow x10     Clothespegs Light x20  Heavy x20 Light x20  Heavy x20 Light x20  Firm x20     grippers  2Y x20 2Y x 20             Wrist weights   Flexion  Extension  Radial dev  x15 1lb  pron/sup   With hammer x 15                                                           Assessment: Patient tolerated session and upgrades very well  Plan: Continue Occupational Therapy ~2x/week to decrease pain and edema and improve ROM, strength and function

## 2019-09-25 ENCOUNTER — OFFICE VISIT (OUTPATIENT)
Dept: OCCUPATIONAL THERAPY | Facility: CLINIC | Age: 39
End: 2019-09-25
Payer: OTHER MISCELLANEOUS

## 2019-09-25 DIAGNOSIS — S52.601D CLOSED FRACTURE OF DISTAL ENDS OF RIGHT RADIUS AND ULNA WITH ROUTINE HEALING, SUBSEQUENT ENCOUNTER: Primary | ICD-10-CM

## 2019-09-25 DIAGNOSIS — S52.501D CLOSED FRACTURE OF DISTAL ENDS OF RIGHT RADIUS AND ULNA WITH ROUTINE HEALING, SUBSEQUENT ENCOUNTER: Primary | ICD-10-CM

## 2019-09-25 PROCEDURE — 97140 MANUAL THERAPY 1/> REGIONS: CPT

## 2019-09-25 PROCEDURE — 97530 THERAPEUTIC ACTIVITIES: CPT

## 2019-09-25 PROCEDURE — 97110 THERAPEUTIC EXERCISES: CPT

## 2019-09-25 NOTE — PROGRESS NOTES
Daily Note     Today's date: 2019  Patient name: Pricila Steele  : 1980  MRN: [id number]  Referring provider: Omayra Bolanos DO  Dx:   Encounter Diagnosis     ICD-10-CM    1  Closed fracture of distal ends of right radius and ulna with routine healing, subsequent encounter S52 501D     S51 165T          Subjective: Patient reports pain level as 0/10 unless stretching to end range  Objective:   Initiated treatment with double hot pack x ~5min for warm up to R wrist      Completed manual therapy with PROM  myofascial soft tissue work and Graston techniques to forearm flexors and extensors and wrist joint to increase wrist ROM  Completed  exercises and activities to increase ROM, strength, coordination and function  Home Program:See Media Section for details  wrist ROM ex   light green gripper    Precautions: s/p ORIF 19      Exercise Diary  19    Weight Well- no weight Pron/Sup  Reps x2  Pron/Sup  Reps x 3 cycles   Pron/Sup  Reps x 3 cycles Pron/Sup  Reps x 3 cycles    Wrist Roll  Flex/Ext  Pro/Sup   x5 each        Wrist maze   x5 x5 x5 x5    Rubber Bands Red x10  Yellow x10 Red x10  Yellow x10 Red x10  Yellow x10     Clothespegs Light x20  Heavy x20 Light x20  Heavy x20 Light x20  Firm x20 Light x20  Firm x20    grippers  2Y x20 2Y x 20 3Y x 20            Wrist weights   Flexion  Extension  Radial dev  x15 1lb  Flexion  Extension  Radial dev  x15 1lb  pron/sup   With hammer x 15 Hammer x 15                                                          Assessment: Patient tolerated session and upgrades very well  Plan: Continue Occupational Therapy ~2x/week to decrease pain and edema and improve ROM, strength and function

## 2019-09-27 ENCOUNTER — APPOINTMENT (OUTPATIENT)
Dept: OCCUPATIONAL THERAPY | Facility: CLINIC | Age: 39
End: 2019-09-27
Payer: OTHER MISCELLANEOUS

## 2019-09-30 ENCOUNTER — OFFICE VISIT (OUTPATIENT)
Dept: OCCUPATIONAL THERAPY | Facility: CLINIC | Age: 39
End: 2019-09-30
Payer: OTHER MISCELLANEOUS

## 2019-09-30 DIAGNOSIS — S52.501D CLOSED FRACTURE OF DISTAL ENDS OF RIGHT RADIUS AND ULNA WITH ROUTINE HEALING, SUBSEQUENT ENCOUNTER: Primary | ICD-10-CM

## 2019-09-30 DIAGNOSIS — S52.601D CLOSED FRACTURE OF DISTAL ENDS OF RIGHT RADIUS AND ULNA WITH ROUTINE HEALING, SUBSEQUENT ENCOUNTER: Primary | ICD-10-CM

## 2019-09-30 PROCEDURE — 97530 THERAPEUTIC ACTIVITIES: CPT | Performed by: OCCUPATIONAL THERAPIST

## 2019-09-30 PROCEDURE — 97140 MANUAL THERAPY 1/> REGIONS: CPT | Performed by: OCCUPATIONAL THERAPIST

## 2019-09-30 PROCEDURE — 97110 THERAPEUTIC EXERCISES: CPT | Performed by: OCCUPATIONAL THERAPIST

## 2019-09-30 NOTE — PROGRESS NOTES
OT Re-Evaluation     Today's date: 2019  Patient name: Phylicia Thomas  : 1980  MRN: [id number]  Referring provider: Lauren Aguilar DO  Dx:   Encounter Diagnosis     ICD-10-CM    1  Closed fracture of distal ends of right radius and ulna with routine healing, subsequent encounter S52 501D     S52 601D        Start Time: 0305  Stop Time: 4657  Total time in clinic (min): 75 minutes    Assessment  Assessment details: CASE SUMMARY:   Phylicia Thomas is a 37y o  year old male who suffered a R wrist fracture after falling while strapping down his load on his truck on 19  The wrist became unstable in the loose cast and so required a surgery for ORIF on 19  PMHx includes: See chart for full details with medications  He is  from wife and lives with his sister currently  He works as a    PROGRESS UPDATE: 19  Phylicia Thomas was initially seen in our department on 19 and has since been seen ~2x/week  Treatment includes moist heat, manual therapy with myofacial soft tissue work and Graston techniques, PROM and recently gentle joint mobs, therapeutic exercises to increase ROM and strength  He is showing good improvement in ROM and strength  Pain level is decreasing  He is able to do more ADLs however heavy tasks and lifting are still difficult  He could benefit from a few more weeks of therapy to increase ROM, strength and function  FUNCTIONAL SUMMARY:   Phylicia Thomas is independent in basic self care skills  He has difficulty with lifting, cooking, cleaning and work tasks  FOTO score is improved from 47% to 60% with a 68% prediction in function       IMPAIRMENTS:  Phylicia Thomas presents with:  Healed incision with no  adhesion,   Mild edema in the R wrist,   Improving ROM in the R wrist,   Improving but still limited wrist and hand strength,   Intact sensation,   Pain level at 0-3/10   Difficulty with ADLs and work tasks   Patient would benefit from Occupational Therapy to address these impairments in order to return to His prior level of function  Understanding of Dx/Px/POC: good   Prognosis: good    Goals  Short Term Goals:   to be completed in 6-8 weeks  1  Decrease edema of R wrist through manual lymphatic drainage massage, tubigrip stockinette, and /or kinesiotape , Partially Met  2  Increase ROM of R wrist to WNLs, through the use of heat modalities, manual therapy with Graston techniques, PROM, joint mobilizations, AROM exercises, flexion taping and or splinting as needed  Partially Met  3  Increase  wrist strength to 5/5 and hand strength right =50% of unaffected side  Partially Met  4  Decrease pain to 0-2/10  Partially Met    Long Term Goals: To be completed in 8-10 weeks  1 Ability to perform lifting, carrying, cooking,cleaning tasks and work tasks with minimal to no discomfort, Partially Met  2  Patient demonstrates good carryover of HEP, Met  3  Minimal to no pain complaints  0-2/10, Partially Met  4  Full ROM of R wrist  Partially Met  5  Improved wrist strength to 5/5 and hand strength  right =75% of unaffected side  Partially Met      Plan  Patient would benefit from: skilled occupational therapy  Planned modality interventions: thermotherapy: hydrocollator packs and ultrasound  Planned therapy interventions: joint mobilization, manual therapy, massage, strengthening, stretching, therapeutic activities, therapeutic exercise, functional ROM exercises, flexibility and home exercise program  Frequency: 2x week  Duration in weeks: 12  Plan of Care beginning date: 2019  Plan of Care expiration date: 2019        Subjective Evaluation    Pain  Current pain ratin  At best pain ratin  At worst pain rating: 3  Location:  In wrist   Quality: dull ache    Hand dominance: right    Treatments  Previous treatment: immobilization  Current treatment: occupational therapy  Patient Goals  Patient goals for therapy: decreased edema, decreased pain, increased motion, increased strength, independence with ADLs/IADLs and return to work          Objective     Observations     Additional Observation Details  Volar scar - no adhesions    Neurological Testing     Sensation     Wrist/Hand     Right   Intact: light touch    Additional Neurological Details  No numbness reported  Active Range of Motion     Right Elbow   Forearm supination: 65 degrees   Forearm pronation: 67 degrees     Right Wrist   Wrist flexion: 50 degrees   Wrist extension: 60 degrees   Radial deviation: 20 degrees   Ulnar deviation: 32 degrees     Strength/Myotome Testing     Left Wrist/Hand   Wrist extension: 4  Wrist flexion: 4     (2nd hand position)     Trial 1: 80    Thumb Strength  Key/Lateral Pinch     Trail 1: 20 5  Tip/Two-Point Pinch     Trial 1: 11 5  Palmar/Three-Point Pinch     Trial 1: 11    Right Wrist/Hand      (2nd hand position)     Trial 1: 35    Thumb Strength   Key/Lateral Pinch     Trial 1: 22  Tip/Two-Point Pinch     Trial 1: 17  Palmar/Three-Point Pinch     Trial 1: 15    Swelling     Left Wrist/Hand   Circumference wrist: 18 6 cm    Right Wrist/Hand   Circumference wrist: 17 4 cm             Treatment Today:     Subjective: Patient reports pain level as 0/10 unless stretching to end range  Objective:   Initiated treatment with double hot pack x ~5min for warm up to R wrist      Completed manual therapy with PROM  myofascial soft tissue work and Graston techniques to forearm flexors and extensors and wrist joint to increase wrist ROM  Completed  exercises and activities to increase ROM, strength, coordination and function  Home Program:See Media Section for details    wrist ROM ex   light green gripper    Precautions: s/p ORIF 8/21/19      Exercise Diary  9/16/19 9/18/19 9/23/19 9/25 9/30/19   Weight Well- no weight Pron/Sup  Reps x2  Pron/Sup  Reps x 3 cycles   Pron/Sup  Reps x 3 cycles Pron/Sup  Reps x 3 cycles    Wrist Roll  Flex/Ext  Pro/Sup   x5 each        Wrist maze   x5 x5 x5 x5 x5   Rubber Bands Red x10  Yellow x10 Red x10  Yellow x10 Red x10  Yellow x10     Clothespegs Light x20  Heavy x20 Light x20  Heavy x20 Light x20  Firm x20 Light x20  Firm x20 Light x20  Firm x20   grippers  2Y x20 2Y x 20 3Y x 20 3B x20           Wrist weights   Flexion  Extension  Radial dev  x15 1lb  Flexion  Extension  Radial dev  x15 1lb  Flexion  Extension  Radial dev  x15 3lb  pron/sup   With hammer x 15 Hammer x 15 Powerbar x 15   Putty     Gripping, pinching,  Kneading,  Jar turns x10  Jar turns x10   bottle turns x10                                                 Assessment: Patient tolerated session and upgrades very well  He could benefit from a few more weeks to prepare him for return to work  Plan: Continue Occupational Therapy ~2x/week to decrease pain and edema and improve ROM, strength and function

## 2019-10-01 ENCOUNTER — APPOINTMENT (OUTPATIENT)
Dept: RADIOLOGY | Facility: CLINIC | Age: 39
End: 2019-10-01
Payer: OTHER MISCELLANEOUS

## 2019-10-01 ENCOUNTER — OFFICE VISIT (OUTPATIENT)
Dept: OBGYN CLINIC | Facility: CLINIC | Age: 39
End: 2019-10-01

## 2019-10-01 VITALS
DIASTOLIC BLOOD PRESSURE: 80 MMHG | BODY MASS INDEX: 27.57 KG/M2 | WEIGHT: 192.6 LBS | SYSTOLIC BLOOD PRESSURE: 138 MMHG | HEIGHT: 70 IN | HEART RATE: 66 BPM

## 2019-10-01 DIAGNOSIS — S52.601D CLOSED FRACTURE OF DISTAL ENDS OF RIGHT RADIUS AND ULNA WITH ROUTINE HEALING, SUBSEQUENT ENCOUNTER: Primary | ICD-10-CM

## 2019-10-01 DIAGNOSIS — Z87.81 S/P ORIF (OPEN REDUCTION INTERNAL FIXATION) FRACTURE: ICD-10-CM

## 2019-10-01 DIAGNOSIS — S52.501D CLOSED FRACTURE OF DISTAL ENDS OF RIGHT RADIUS AND ULNA WITH ROUTINE HEALING, SUBSEQUENT ENCOUNTER: Primary | ICD-10-CM

## 2019-10-01 DIAGNOSIS — Z98.890 S/P ORIF (OPEN REDUCTION INTERNAL FIXATION) FRACTURE: ICD-10-CM

## 2019-10-01 DIAGNOSIS — S52.601D CLOSED FRACTURE OF DISTAL ENDS OF RIGHT RADIUS AND ULNA WITH ROUTINE HEALING, SUBSEQUENT ENCOUNTER: ICD-10-CM

## 2019-10-01 DIAGNOSIS — S52.501D CLOSED FRACTURE OF DISTAL ENDS OF RIGHT RADIUS AND ULNA WITH ROUTINE HEALING, SUBSEQUENT ENCOUNTER: ICD-10-CM

## 2019-10-01 PROCEDURE — 99024 POSTOP FOLLOW-UP VISIT: CPT | Performed by: ORTHOPAEDIC SURGERY

## 2019-10-01 PROCEDURE — 73100 X-RAY EXAM OF WRIST: CPT

## 2019-10-01 NOTE — LETTER
October 1, 2019     Patient: Shanice Cesar   YOB: 1980   Date of Visit: 10/1/2019       To Whom it May Concern:    Darcie Zarate is under my professional care  He was seen in my office on 10/1/2019  He should remain out of work until his follow up in 4 weeks  Work status will be reevaluated at that time  If you have any questions or concerns, please don't hesitate to call           Sincerely,          Christie Marquez DO        CC: No Recipients

## 2019-10-01 NOTE — PROGRESS NOTES
Assessment:   S/P ORIF R distal radius fx 08/21/19    Plan: Activities as tolerated  Can start strengthening with therapy  Correct pronation/supination d/w pt today in order to avoid compensation    Follow Up:  4  week(s)      CHIEF COMPLAINT:  No chief complaint on file  SUBJECTIVE:  Oskar Cruz is a 37 y o  male who presents for follow up S/P ORIF R distal radius fx 08/21/19  Patient has been going to OT 2x/wk since 09/11/19  Patient states his therapy is going well  States that he feels like his motion is improving  Describes the most difficulty when pressure is applied to an extended wrist  States he has been trying to avoid weight with this hand  PHYSICAL EXAMINATION:  Vital signs: /80   Pulse 66   Ht 5' 10" (1 778 m)   Wt 87 4 kg (192 lb 9 6 oz)   BMI 27 64 kg/m²   General: well developed and well nourished, alert, oriented times 3 and appears comfortable  Psychiatric: Normal    MUSCULOSKELETAL EXAMINATION:  Incision: Clean, dry, intact  Range of Motion: Flexion approximately 50-55 degrees, extension 65 degrees, Supination 75, pronation 80 degrees  Neurovascular status: Neuro intact and radial pulse 2+  Activity Restrictions: No restrictions         STUDIES REVIEWED:  Images were reviewd in PACS: Xrays today show continued healing of distal radius fx s/p ORIF  Hardware intact        PROCEDURES PERFORMED:  Procedures  No Procedures performed today

## 2019-10-02 ENCOUNTER — OFFICE VISIT (OUTPATIENT)
Dept: OCCUPATIONAL THERAPY | Facility: CLINIC | Age: 39
End: 2019-10-02
Payer: OTHER MISCELLANEOUS

## 2019-10-02 DIAGNOSIS — S52.501D CLOSED FRACTURE OF DISTAL ENDS OF RIGHT RADIUS AND ULNA WITH ROUTINE HEALING, SUBSEQUENT ENCOUNTER: Primary | ICD-10-CM

## 2019-10-02 DIAGNOSIS — S52.601D CLOSED FRACTURE OF DISTAL ENDS OF RIGHT RADIUS AND ULNA WITH ROUTINE HEALING, SUBSEQUENT ENCOUNTER: Primary | ICD-10-CM

## 2019-10-02 PROCEDURE — 97140 MANUAL THERAPY 1/> REGIONS: CPT | Performed by: OCCUPATIONAL THERAPIST

## 2019-10-02 PROCEDURE — 97110 THERAPEUTIC EXERCISES: CPT | Performed by: OCCUPATIONAL THERAPIST

## 2019-10-02 PROCEDURE — 97530 THERAPEUTIC ACTIVITIES: CPT | Performed by: OCCUPATIONAL THERAPIST

## 2019-10-02 NOTE — PROGRESS NOTES
Daily Note     Today's date: 10/2/2019  Patient name: Ney Mclain  : 1980  MRN: [id number]  Referring provider: Anjana Diaz DO  Dx:   Encounter Diagnosis     ICD-10-CM    1  Closed fracture of distal ends of right radius and ulna with routine healing, subsequent encounter S52 501D     S52 439J        Start Time: 0245  Stop Time: 0340  Total time in clinic (min): 55 minutes    Subjective: Patient reports pain level as 0/10 unless stretching to end range  Patient saw the Dr and is to continue another 4 weeks  Objective:   Initiated treatment with double hot pack x ~5min for warm up to R wrist      Completed manual therapy with PROM  myofascial soft tissue work and Graston techniques to forearm flexors and extensors and wrist joint to increase wrist ROM  Completed  exercises and activities to increase ROM, strength, coordination and function  Home Program:See Media Section for details  wrist ROM ex   light green gripper    Precautions: s/p ORIF 19      Exercise Diary  10/2/19 9/18/19 9/23/19 9/25 9/30/19   Weight Well- no weight   Pron/Sup  Reps x 3 cycles   Pron/Sup  Reps x 3 cycles Pron/Sup  Reps x 3 cycles    Wrist Roll         Wrist maze   x5 x5 x5 x5 x5   Rubber Bands Red x10  Yellow x10 Red x10  Yellow x10 Red x10  Yellow x10     Clothespegs Light x20  Heavy x20 Light x20  Heavy x20 Light x20  Firm x20 Light x20  Firm x20 Light x20  Firm x20   grippers 3B x20 2Y x20 2Y x 20 3Y x 20 3B x20           Wrist weights Flexion  Extension  Radial dev  x15 3lb  Flexion  Extension  Radial dev  x15 1lb  Flexion  Extension  Radial dev  x15 1lb  Flexion  Extension  Radial dev  x15 3lb     pron/sup Powerbar x15  With hammer x 15 Hammer x 15 Powerbar x 15   Putty Gripping, pinching,  Kneading,  Jar turns x10  Jar turns x10   bottle turns x10    Gripping, pinching,  Kneading,  Jar turns x10  Jar turns x10   bottle turns x10                                                 Assessment: Patient tolerated session and upgrades very well  He will continue a few more weeks to prepare him for return to work  Plan: Continue Occupational Therapy ~2x/week to decrease pain and edema and improve ROM, strength and function

## 2019-10-07 ENCOUNTER — OFFICE VISIT (OUTPATIENT)
Dept: OCCUPATIONAL THERAPY | Facility: CLINIC | Age: 39
End: 2019-10-07
Payer: OTHER MISCELLANEOUS

## 2019-10-07 DIAGNOSIS — S52.601D CLOSED FRACTURE OF DISTAL ENDS OF RIGHT RADIUS AND ULNA WITH ROUTINE HEALING, SUBSEQUENT ENCOUNTER: Primary | ICD-10-CM

## 2019-10-07 DIAGNOSIS — S52.501D CLOSED FRACTURE OF DISTAL ENDS OF RIGHT RADIUS AND ULNA WITH ROUTINE HEALING, SUBSEQUENT ENCOUNTER: Primary | ICD-10-CM

## 2019-10-07 PROCEDURE — 97110 THERAPEUTIC EXERCISES: CPT | Performed by: OCCUPATIONAL THERAPIST

## 2019-10-07 PROCEDURE — 97140 MANUAL THERAPY 1/> REGIONS: CPT | Performed by: OCCUPATIONAL THERAPIST

## 2019-10-07 PROCEDURE — 97530 THERAPEUTIC ACTIVITIES: CPT | Performed by: OCCUPATIONAL THERAPIST

## 2019-10-07 NOTE — PROGRESS NOTES
Daily Note     Today's date: 10/7/2019  Patient name: Mateo Foster  : 1980  MRN: [id number]  Referring provider: Delgado Issa DO  Dx:   Encounter Diagnosis     ICD-10-CM    1  Closed fracture of distal ends of right radius and ulna with routine healing, subsequent encounter S52 501D     S52 601P        Start Time: 0240  Stop Time: 0330  Total time in clinic (min): 50 minutes    Subjective: Patient reports pain level as 0/10  Objective:     Initiated treatment with double hot pack x ~5min for warm up to R wrist      Completed manual therapy with PROM  myofascial soft tissue work and Graston techniques to forearm flexors and extensors and wrist joint to increase wrist ROM  Completed  exercises and activities to increase ROM, strength, coordination and function  Home Program:See Media Section for details  wrist ROM ex   light green gripper    Precautions: s/p ORIF 19      Exercise Diary  10/2/19 10/7/19 9/23/19 9/25 9/30/19   Weight Well- no weight      Pron/Sup  Reps x 3 cycles Pron/Sup  Reps x 3 cycles    Wrist Roll         Wrist maze   x5 x5 x5 x5 x5   Rubber Bands Red x10  Yellow x10 Red x10  Yellow x10 Red x10  Yellow x10     Clothespegs Light x20  Heavy x20  Light x20  Firm x20 Light x20  Firm x20 Light x20  Firm x20   grippers 3B x20 3B x20 2Y x 20 3Y x 20 3B x20           Wrist weights Flexion  Extension  Radial dev  x15 3lb Flexion  Extension  Radial dev  x15 3lb Flexion  Extension  Radial dev  x15 1lb  Flexion  Extension  Radial dev  x15 1lb  Flexion  Extension  Radial dev  x15 3lb     pron/sup Powerbar x15 Powerbar x15 With hammer x 15 Hammer x 15 Powerbar x 15   Putty Gripping, pinching,  Kneading,  Jar turns x10  Jar turns x10   bottle turns x10 Gripping, pinching,  Kneading,  Jar turns x10  Jar turns x10  bottle turns x10   Gripping, pinching,  Kneading,  Jar turns x10  Jar turns x10   bottle turns x10   Therabar stabilization  X 1 min      Flex bar   x10 Assessment: Patient tolerated session and upgrades very well  He will continue a few more weeks to prepare him for return to work  Plan: Continue Occupational Therapy ~2x/week to decrease pain and edema and improve ROM, strength and function

## 2019-10-09 ENCOUNTER — OFFICE VISIT (OUTPATIENT)
Dept: OCCUPATIONAL THERAPY | Facility: CLINIC | Age: 39
End: 2019-10-09
Payer: OTHER MISCELLANEOUS

## 2019-10-09 DIAGNOSIS — S52.601D CLOSED FRACTURE OF DISTAL ENDS OF RIGHT RADIUS AND ULNA WITH ROUTINE HEALING, SUBSEQUENT ENCOUNTER: Primary | ICD-10-CM

## 2019-10-09 DIAGNOSIS — S52.501D CLOSED FRACTURE OF DISTAL ENDS OF RIGHT RADIUS AND ULNA WITH ROUTINE HEALING, SUBSEQUENT ENCOUNTER: Primary | ICD-10-CM

## 2019-10-09 PROCEDURE — 97530 THERAPEUTIC ACTIVITIES: CPT | Performed by: OCCUPATIONAL THERAPIST

## 2019-10-09 PROCEDURE — 97140 MANUAL THERAPY 1/> REGIONS: CPT | Performed by: OCCUPATIONAL THERAPIST

## 2019-10-09 NOTE — PROGRESS NOTES
Daily Note     Today's date: 10/9/2019  Patient name: Tika Carson  : 1980  MRN: [id number]  Referring provider: Prabhu Arroyo DO  Dx:   Encounter Diagnosis     ICD-10-CM    1  Closed fracture of distal ends of right radius and ulna with routine healing, subsequent encounter S52 501D     S56 606D        Start Time: 0245  Stop Time: 0330  Total time in clinic (min): 45 minutes    Subjective: Patient reports pain level as 0/10  Objective:   Initiated treatment with double hot pack x ~5min for warm up to R wrist      Completed manual therapy with PROM  myofascial soft tissue work and Graston techniques to forearm flexors and extensors and wrist joint to increase wrist ROM  Completed  exercises and activities to increase ROM, strength, coordination and function  See treatment diary below  Home Program:See Media Section for details  wrist ROM ex   light green gripper    Precautions: s/p ORIF 19      Exercise Diary  10/2/19 10/7/19 10/9/19 9/25 9/30/19   Weight Well- no weight      Pron/Sup  Reps x 3 cycles    Wrist Roll         Wrist maze   x5 x5 x5 x5 x5   Rubber Bands Red x10  Yellow x10 Red x10  Yellow x10 Red x10  Yellow x10     Clothespegs Light x20  Heavy x20  Light x20  Firm x20 Light x20  Firm x20 Light x20  Firm x20   grippers 3B x20 3B x20 3B x 20 3Y x 20 3B x20           Wrist weights Flexion  Extension  Radial dev  x15 3lb Flexion  Extension  Radial dev  x15 3lb Flexion (2lb)  Extension  Radial dev  x15 3lb  Flexion  Extension  Radial dev  x15 1lb  Flexion  Extension  Radial dev  x15 3lb     pron/sup Powerbar x15 Powerbar x15 With powerbar x 15 Hammer x 15 Powerbar x 15   Putty Gripping, pinching,  Kneading,  Jar turns x10  Jar turns x10   bottle turns x10 Gripping, pinching,  Kneading,  Jar turns x10  Jar turns x10  bottle turns x10 Gripping, pinching,  Kneading,  Jar turns x10  Jar turns x10  bottle turns x10  Gripping, pinching,  Kneading,  Jar turns x10  Jar turns x10   bottle turns x10   Therabar stabilization  X 1 min x1 min     Flex bar   x10 x10                                   Assessment: Patient tolerated session and upgrades very well  He will continue a few more weeks to prepare him for return to work  Wrist flexion is difficult  Plan: Continue Occupational Therapy ~2x/week to decrease pain and edema and improve ROM, strength and function

## 2019-10-15 ENCOUNTER — OFFICE VISIT (OUTPATIENT)
Dept: OCCUPATIONAL THERAPY | Facility: CLINIC | Age: 39
End: 2019-10-15
Payer: OTHER MISCELLANEOUS

## 2019-10-15 DIAGNOSIS — S52.501D CLOSED FRACTURE OF DISTAL ENDS OF RIGHT RADIUS AND ULNA WITH ROUTINE HEALING, SUBSEQUENT ENCOUNTER: Primary | ICD-10-CM

## 2019-10-15 DIAGNOSIS — S52.601D CLOSED FRACTURE OF DISTAL ENDS OF RIGHT RADIUS AND ULNA WITH ROUTINE HEALING, SUBSEQUENT ENCOUNTER: Primary | ICD-10-CM

## 2019-10-15 PROCEDURE — 97110 THERAPEUTIC EXERCISES: CPT | Performed by: OCCUPATIONAL THERAPIST

## 2019-10-15 PROCEDURE — 97140 MANUAL THERAPY 1/> REGIONS: CPT | Performed by: OCCUPATIONAL THERAPIST

## 2019-10-15 PROCEDURE — 97530 THERAPEUTIC ACTIVITIES: CPT | Performed by: OCCUPATIONAL THERAPIST

## 2019-10-15 NOTE — PROGRESS NOTES
Daily Note     Today's date: 10/15/2019  Patient name: Meño Joy  : 1980  MRN: [id number]  Referring provider: Bib Vidales DO  Dx:   Encounter Diagnosis     ICD-10-CM    1  Closed fracture of distal ends of right radius and ulna with routine healing, subsequent encounter S55 584D     D02 247F                   Subjective: Patient reports pain level as 0/10  Objective:   Initiated treatment with double hot pack x ~5min for warm up to R wrist      Completed manual therapy with PROM  myofascial soft tissue work and Graston techniques to forearm flexors and extensors and wrist joint to increase wrist ROM  Completed  exercises and activities to increase ROM, strength, coordination and function  See treatment diary below  Home Program:See Media Section for details  wrist ROM ex   light green gripper    Precautions: s/p ORIF 19      Exercise Diary  10/2/19 10/7/19 10/9/19 10/15/19 9/30/19   Weight Well- no weight          Wrist Roll         Wrist maze   x5 x5 x5 x5 x5   Rubber Bands Red x10  Yellow x10 Red x10  Yellow x10 Red x10  Yellow x10 Red x10  Yellow x10    Clothespegs Light x20  Heavy x20  Light x20  Firm x20 Light x20  Firm x20 Light x20  Firm x20   grippers 3B x20 3B x20 3B x 20 3Y x 20 3B x20           Wrist weights Flexion  Extension  Radial dev  x15 3lb Flexion  Extension  Radial dev  x15 3lb Flexion (2lb)  Extension  Radial dev  x15 3lb  Flexion (3lb)  Extension  Radial dev  x15 1lb  Flexion  Extension  Radial dev  x15 3lb     pron/sup Powerbar x15 Powerbar x15 With powerbar x 15 With powerbar x 15 Powerbar x 15   Putty Gripping, pinching,  Kneading,  Jar turns x10  Jar turns x10   bottle turns x10 Gripping, pinching,  Kneading,  Jar turns x10  Jar turns x10  bottle turns x10 Gripping, pinching,  Kneading,  Jar turns x10  Jar turns x10  bottle turns x10  Gripping, pinching,  Kneading,  Jar turns x10  Jar turns x10   bottle turns x10   Therabar stabilization  X 1 min x1 min X 1 min    Flex bar   x10 x10 x10            Pulleys    Triceps  IR  Protraction  ER  Biceps  Level 3 x 20                    Assessment: Patient tolerated session and upgrades very well  He will continue a few more weeks to prepare him for return to work  Wrist flexion is difficult  Plan: Continue Occupational Therapy ~2x/week to decrease pain and edema and improve ROM, strength and function

## 2019-10-17 ENCOUNTER — OFFICE VISIT (OUTPATIENT)
Dept: OCCUPATIONAL THERAPY | Facility: CLINIC | Age: 39
End: 2019-10-17
Payer: OTHER MISCELLANEOUS

## 2019-10-17 DIAGNOSIS — S52.601D CLOSED FRACTURE OF DISTAL ENDS OF RIGHT RADIUS AND ULNA WITH ROUTINE HEALING, SUBSEQUENT ENCOUNTER: Primary | ICD-10-CM

## 2019-10-17 DIAGNOSIS — S52.501D CLOSED FRACTURE OF DISTAL ENDS OF RIGHT RADIUS AND ULNA WITH ROUTINE HEALING, SUBSEQUENT ENCOUNTER: Primary | ICD-10-CM

## 2019-10-17 PROCEDURE — 97140 MANUAL THERAPY 1/> REGIONS: CPT | Performed by: OCCUPATIONAL THERAPIST

## 2019-10-17 PROCEDURE — 97530 THERAPEUTIC ACTIVITIES: CPT | Performed by: OCCUPATIONAL THERAPIST

## 2019-10-17 PROCEDURE — 97110 THERAPEUTIC EXERCISES: CPT | Performed by: OCCUPATIONAL THERAPIST

## 2019-10-17 NOTE — PROGRESS NOTES
Daily Note     Today's date: 10/17/2019  Patient name: Didier Bailey  : 1980  MRN: [id number]  Referring provider: Sarita Gonzales DO  Dx:   Encounter Diagnosis     ICD-10-CM    1  Closed fracture of distal ends of right radius and ulna with routine healing, subsequent encounter S52 501D     S52 609V        Start Time: 1300  Stop Time: 1345  Total time in clinic (min): 45 minutes    Subjective: Patient reports pain level as 0/10  Objective:   Initiated treatment with double hot pack x ~5min for warm up to R wrist      Completed manual therapy with PROM  myofascial soft tissue work and Graston techniques to forearm flexors and extensors and wrist joint to increase wrist ROM  Completed  exercises and activities to increase ROM, strength, coordination and function  See treatment diary below  Home Program:See Media Section for details  wrist ROM ex   light green gripper    Precautions: s/p ORIF 19      Exercise Diary  10/2/19 10/7/19 10/9/19 10/15/19 10/17/19   Weight Well- no weight          Wrist Roll         Wrist maze   x5 x5 x5 x5 x5   Rubber Bands Red x10  Yellow x10 Red x10  Yellow x10 Red x10  Yellow x10 Red x10  Yellow x10    Clothespegs Light x20  Heavy x20  Light x20  Firm x20 Light x20  Firm x20 Light x20  Firm x20   grippers 3B x20 3B x20 3B x 20 3Y x 20 3B x20           Wrist weights Flexion  Extension  Radial dev  x15 3lb Flexion  Extension  Radial dev  x15 3lb Flexion (2lb)  Extension  Radial dev  x15 3lb  Flexion (3lb)  Extension  Radial dev  x15 1lb  Flexion  Extension  Radial dev  x15 3lb     pron/sup Powerbar x15 Powerbar x15 With powerbar x 15 With powerbar x 15 Powerbar x 15   Putty Gripping, pinching,  Kneading,  Jar turns x10  Jar turns x10   bottle turns x10 Gripping, pinching,  Kneading,  Jar turns x10  Jar turns x10  bottle turns x10 Gripping, pinching,  Kneading,  Jar turns x10  Jar turns x10  bottle turns x10  Gripping, pinching,  Kneading,  Jar turns x10  Jar turns x10   bottle turns x10   Therabar stabilization  X 1 min x1 min X 1 min x1min   Flex bar   x10 x10 x10 x10           Pulleys    Triceps  IR  Protraction  ER  Biceps  Level 3 x 20      Lifting task     22 5 lbs lifting and place 2 cycles around table  Assessment: Patient tolerated session and upgrades very well  He will continue a few more weeks to prepare him for return to work  Plan: Continue Occupational Therapy ~2x/week to decrease pain and edema and improve ROM, strength and function

## 2019-10-21 ENCOUNTER — OFFICE VISIT (OUTPATIENT)
Dept: OCCUPATIONAL THERAPY | Facility: CLINIC | Age: 39
End: 2019-10-21
Payer: OTHER MISCELLANEOUS

## 2019-10-21 DIAGNOSIS — S52.501D CLOSED FRACTURE OF DISTAL ENDS OF RIGHT RADIUS AND ULNA WITH ROUTINE HEALING, SUBSEQUENT ENCOUNTER: Primary | ICD-10-CM

## 2019-10-21 DIAGNOSIS — S52.601D CLOSED FRACTURE OF DISTAL ENDS OF RIGHT RADIUS AND ULNA WITH ROUTINE HEALING, SUBSEQUENT ENCOUNTER: Primary | ICD-10-CM

## 2019-10-21 PROCEDURE — 97530 THERAPEUTIC ACTIVITIES: CPT | Performed by: OCCUPATIONAL THERAPIST

## 2019-10-21 PROCEDURE — 97110 THERAPEUTIC EXERCISES: CPT | Performed by: OCCUPATIONAL THERAPIST

## 2019-10-21 PROCEDURE — 97140 MANUAL THERAPY 1/> REGIONS: CPT | Performed by: OCCUPATIONAL THERAPIST

## 2019-10-21 NOTE — PROGRESS NOTES
OT Re-Evaluation     Today's date: 10/21/2019  Patient name: Malina Quiroz  : 1980  MRN: [id number]  Referring provider: Francis Kapoor DO  Dx:   Encounter Diagnosis     ICD-10-CM    1  Closed fracture of distal ends of right radius and ulna with routine healing, subsequent encounter S52 501D     S52 601D        Start Time: 219  Stop Time: 330  Total time in clinic (min): 71 minutes    Assessment  Assessment details: CASE SUMMARY:   Malina Quiroz is a 37y o  year old male who suffered a R wrist fracture after falling while strapping down his load on his truck on 19  The wrist became unstable in the loose cast and so required a surgery for ORIF on 19  PMHx includes: See chart for full details with medications  He is  from wife and lives with his sister currently  He works as a    PROGRESS UPDATE:   Malina Quiroz was initially seen in our department on 19 and has since been seen ~2x/week  Treatment includes moist heat, manual therapy with myofacial soft tissue work and Graston techniques, PROM and joint mobs, therapeutic exercises to increase ROM and strength  Michael Garcia is showing good improvement in ROM and strength  Pain level is decreasing to 0-1/10  He is able to do more ADLs, however lifting and heavy gripping task  However, he could benefit from a few more weeks of therapy to increase ROM, strength and function for return to work  FUNCTIONAL SUMMARY:   Malina Quiroz is independent in basic self care skills  He has difficulty with lifting, cooking, cleaning and work tasks  FOTO score is improved from 47% to 71% with a 68% prediction in function       IMPAIRMENTS:  Malina Quiroz presents with:  Healed incision with no  adhesion,   Mild edema in the R wrist,   Improving  ROM in the R wrist, but still a little limited in flexion  Improvingwrist and hand strength,   Intact sensation,   Pain level at 0-1/10   Difficulty with ADLs and work tasks   Patient would benefit from Occupational Therapy to address these impairments in order to return to His prior level of function  Understanding of Dx/Px/POC: good   Prognosis: good    Goals  Short Term Goals:   to be completed in 6-8 weeks  1  Decrease edema of R wrist through manual lymphatic drainage massage, tubigrip stockinette, and /or kinesiotape , Met  2  Increase ROM of R wrist to WNLs, through the use of heat modalities, manual therapy with Graston techniques, PROM, joint mobilizations, AROM exercises, flexion taping and or splinting as needed  Partially Met  3  Increase  wrist strength to 5/5 and hand strength right =50% of unaffected side  Partially Met  4  Decrease pain to 0-2/10  Met      Long Term Goals: To be completed in 8-10 weeks  1 Ability to perform lifting, carrying, cooking,cleaning tasks and work tasks with minimal to no discomfort, Partially Met  2  Patient demonstrates good carryover of HEP, Met  3  Minimal to no pain complaints  0-2/10, Met  4  Full ROM of R wrist  Partially Met  5  Improved wrist strength to 5/5 and hand strength  right =75% of unaffected side  Partially Met    Plan  Patient would benefit from: skilled occupational therapy  Planned modality interventions: thermotherapy: hydrocollator packs and ultrasound  Planned therapy interventions: joint mobilization, manual therapy, massage, strengthening, stretching, therapeutic activities, therapeutic exercise, functional ROM exercises, flexibility and home exercise program  Frequency: 2x week  Duration in weeks: 12  Plan of Care beginning date: 2019  Plan of Care expiration date: 2019        Subjective Evaluation    Pain  Current pain ratin  At best pain ratin  At worst pain ratin  Location:  In wrist   Quality: dull ache  Aggravating factors: lifting    Hand dominance: right    Treatments  Previous treatment: immobilization  Current treatment: occupational therapy  Patient Goals  Patient goals for therapy: decreased edema, decreased pain, increased motion, increased strength, independence with ADLs/IADLs and return to work          Objective     Observations     Additional Observation Details  Volar scar - no adhesions    Neurological Testing     Sensation     Wrist/Hand     Right   Intact: light touch    Additional Neurological Details  No numbness reported  Active Range of Motion     Right Elbow   Forearm supination: 80 degrees   Forearm pronation: 75 degrees     Right Wrist   Wrist flexion: 65 degrees   Wrist extension: 57 degrees   Radial deviation: 18 degrees   Ulnar deviation: 35 degrees     Strength/Myotome Testing     Left Wrist/Hand   Wrist extension: 4+  Wrist flexion: 4+     (2nd hand position)     Trial 1: 80    Thumb Strength  Key/Lateral Pinch     Trail 1: 19  Tip/Two-Point Pinch     Trial 1: 14  Palmar/Three-Point Pinch     Trial 1: 11    Right Wrist/Hand      (2nd hand position)     Trial 1: 40    Thumb Strength   Key/Lateral Pinch     Trial 1: 22  Tip/Two-Point Pinch     Trial 1: 22  Palmar/Three-Point Pinch     Trial 1: 18    Swelling     Left Wrist/Hand   Circumference wrist: 17 6 cm    Right Wrist/Hand   Circumference wrist: 17 1 cm      Flowsheet Rows      Most Recent Value   PT/OT G-Codes   Current Score  71   Projected Score  68             Treatment Today:     Subjective: Patient reports pain level as 0/10  Objective:   Initiated treatment with double hot pack x ~5min for warm up to R wrist      Completed manual therapy with PROM  myofascial soft tissue work and Graston techniques to forearm flexors and extensors and wrist joint to increase wrist ROM  Completed  exercises and activities to increase ROM, strength, coordination and function  See treatment diary below  Home Program:See Media Section for details    wrist ROM ex   light green gripper    Precautions: s/p ORIF 8/21/19      Exercise Diary  10/21/19 10/7/19 10/9/19 10/15/19 10/17/19   Weight Well- no weight          Wrist Roll Wrist maze   x5 x5 x5 x5 x5   Rubber Bands  Red x10  Yellow x10 Red x10  Yellow x10 Red x10  Yellow x10    Clothespegs   Light x20  Firm x20 Light x20  Firm x20 Light x20  Firm x20   grippers 3B x20 3B x20 3B x 20 3Y x 20 3B x20           Wrist weights Flexion  Extension  Radial dev  x15 3lb Flexion  Extension  Radial dev  x15 3lb Flexion (2lb)  Extension  Radial dev  x15 3lb  Flexion (3lb)  Extension  Radial dev  x15 1lb  Flexion  Extension  Radial dev  x15 3lb  pron/sup Powerbar x15 Powerbar x15 With powerbar x 15 With powerbar x 15 Powerbar x 15   Putty Gripping, pinching,  Kneading,  Jar turns x10  Jar turns x10   bottle turns x10 Gripping, pinching,  Kneading,  Jar turns x10  Jar turns x10  bottle turns x10 Gripping, pinching,  Kneading,  Jar turns x10  Jar turns x10  bottle turns x10  Gripping, pinching,  Kneading,  Jar turns x10  Jar turns x10   bottle turns x10   Therabar stabilization X 1min X 1 min x1 min X 1 min x1min   Flex bar  x10 x10 x10 x10 x10           Pulleys Triceps  IR  Protraction  ER  Biceps  Level 4 x 20   Triceps  IR  Protraction  ER  Biceps  Level 3 x 20      Lifting task 22 5 lbs lifting and place 2 cycles around table  22 5 lbs lifting and place 2 cycles around table  Assessment: Patient tolerated session and upgrades very well  He will continue a few more weeks to prepare him for return to work  Plan: Continue Occupational Therapy ~2x/week to decrease pain and edema and improve ROM, strength and function

## 2019-10-23 ENCOUNTER — OFFICE VISIT (OUTPATIENT)
Dept: OCCUPATIONAL THERAPY | Facility: CLINIC | Age: 39
End: 2019-10-23
Payer: OTHER MISCELLANEOUS

## 2019-10-23 DIAGNOSIS — S52.601D CLOSED FRACTURE OF DISTAL ENDS OF RIGHT RADIUS AND ULNA WITH ROUTINE HEALING, SUBSEQUENT ENCOUNTER: Primary | ICD-10-CM

## 2019-10-23 DIAGNOSIS — S52.501D CLOSED FRACTURE OF DISTAL ENDS OF RIGHT RADIUS AND ULNA WITH ROUTINE HEALING, SUBSEQUENT ENCOUNTER: Primary | ICD-10-CM

## 2019-10-23 PROCEDURE — 97110 THERAPEUTIC EXERCISES: CPT | Performed by: OCCUPATIONAL THERAPIST

## 2019-10-23 PROCEDURE — 97140 MANUAL THERAPY 1/> REGIONS: CPT | Performed by: OCCUPATIONAL THERAPIST

## 2019-10-23 PROCEDURE — 97530 THERAPEUTIC ACTIVITIES: CPT | Performed by: OCCUPATIONAL THERAPIST

## 2019-10-23 NOTE — PROGRESS NOTES
Daily Note     Today's date: 10/23/2019  Patient name: Frank Groves  : 1980  MRN: [id number]  Referring provider: Brielle Quinteros DO  Dx:   Encounter Diagnosis     ICD-10-CM    1  Closed fracture of distal ends of right radius and ulna with routine healing, subsequent encounter S52 501D     S52 606A        Start Time: 1400  Stop Time: 9157  Total time in clinic (min): 45 minutes    Subjective: Patient reports pain level as 0/10  Objective:   Initiated treatment with double hot pack x ~5min for warm up to R wrist      Completed manual therapy with PROM  myofascial soft tissue work and IASTM techniques to forearm flexors and extensors and wrist joint to increase wrist ROM  CompletedPROM and joint mobilizations to wrist (ant/post/ulnar/radial/pronation/supination glides Grade II-III) Also completed traction and stretching,        Completed  exercises and activities to increase ROM, strength, coordination and function  See treatment diary below  Home Program:See Media Section for details  wrist ROM ex   light green gripper    Precautions: s/p ORIF 19      Exercise Diary  10/21/19 10/23/19 10/9/19 10/15/19 10/17/19   Weight Well- no weight          Wrist Roll         Wrist maze   x5 x5 x5 x5 x5   Rubber Bands   Red x10  Yellow x10 Red x10  Yellow x10    Clothespegs   Light x20  Firm x20 Light x20  Firm x20 Light x20  Firm x20   grippers 3B x20 4B x20 3B x 20 3Y x 20 3B x20           Wrist weights Flexion  Extension  Radial dev  x15 3lb Flexion  Extension  Radial dev  x15 3lb Flexion (2lb)  Extension  Radial dev  x15 3lb  Flexion (3lb)  Extension  Radial dev  x15 1lb  Flexion  Extension  Radial dev  x15 3lb     pron/sup Powerbar x15 Powerbar x15 With powerbar x 15 With powerbar x 15 Powerbar x 15   Putty Gripping, pinching,  Kneading,  Jar turns x10  Jar turns x10   bottle turns x10 Gripping, pinching,  Kneading,  Jar turns x10  Jar turns x10  bottle turns x10 Gripping, pinching,  Kneading,  Jar turns x10  Jar turns x10  bottle turns x10  Gripping, pinching,  Kneading,  Jar turns x10  Jar turns x10   bottle turns x10   Therabar stabilization X 1min X 1 min x1 min X 1 min x1min   Flex bar  x10 x10 x10 x10 x10           Pulleys Triceps  IR  Protraction  ER  Biceps  Level 4 x 20 Triceps  IR  Protraction  ER  Biceps  Level 4 x 20  Triceps  IR  Protraction  ER  Biceps  Level 3 x 20      Lifting task 22 5 lbs lifting and place 2 cycles around table  27 5 lbs lifting and place 2 cycles around table  22 5 lbs lifting and place 2 cycles around table  Assessment: Patient tolerated treatment well  Patient tolerated upgrades to strengthening program well today  Plan: Continue Occupational Therapy ~2x/week to decrease pain and edema and improve ROM, strength and function  Discharge Update:  Patient saw Dr Sera White and has been released from therapy and is returning to work  Please see 10/21/19 for recent re-evaluation data  He is now discharged

## 2019-10-29 ENCOUNTER — APPOINTMENT (OUTPATIENT)
Dept: OCCUPATIONAL THERAPY | Facility: CLINIC | Age: 39
End: 2019-10-29
Payer: OTHER MISCELLANEOUS

## 2019-10-29 ENCOUNTER — APPOINTMENT (OUTPATIENT)
Dept: RADIOLOGY | Facility: CLINIC | Age: 39
End: 2019-10-29
Payer: OTHER MISCELLANEOUS

## 2019-10-29 ENCOUNTER — OFFICE VISIT (OUTPATIENT)
Dept: OBGYN CLINIC | Facility: CLINIC | Age: 39
End: 2019-10-29

## 2019-10-29 VITALS
WEIGHT: 185 LBS | SYSTOLIC BLOOD PRESSURE: 134 MMHG | BODY MASS INDEX: 26.48 KG/M2 | DIASTOLIC BLOOD PRESSURE: 86 MMHG | HEIGHT: 70 IN | HEART RATE: 76 BPM

## 2019-10-29 DIAGNOSIS — Z87.81 S/P ORIF (OPEN REDUCTION INTERNAL FIXATION) FRACTURE: ICD-10-CM

## 2019-10-29 DIAGNOSIS — Z87.81 S/P ORIF (OPEN REDUCTION INTERNAL FIXATION) FRACTURE: Primary | ICD-10-CM

## 2019-10-29 DIAGNOSIS — Z98.890 S/P ORIF (OPEN REDUCTION INTERNAL FIXATION) FRACTURE: ICD-10-CM

## 2019-10-29 DIAGNOSIS — Z98.890 S/P ORIF (OPEN REDUCTION INTERNAL FIXATION) FRACTURE: Primary | ICD-10-CM

## 2019-10-29 PROCEDURE — 73100 X-RAY EXAM OF WRIST: CPT

## 2019-10-29 PROCEDURE — 99024 POSTOP FOLLOW-UP VISIT: CPT | Performed by: ORTHOPAEDIC SURGERY

## 2019-10-29 NOTE — LETTER
October 29, 2019     Patient: Malina Quiroz   YOB: 1980   Date of Visit: 10/29/2019       To Whom it May Concern:    Sandeep Lewis is under my professional care  He was seen in my office on 10/29/2019  He may return to work full duty with no restrictions  If you have any questions or concerns, please don't hesitate to call           Sincerely,          Aman Darden DO        CC: No Recipients

## 2019-10-29 NOTE — PROGRESS NOTES
Assessment/Plan:  1  S/P ORIF (open reduction internal fixation) fracture         Scribe Attestation    I,:   Eve Robertson MA am acting as a scribe while in the presence of the attending physician :        I,:   Vinayak Washburn, DO personally performed the services described in this documentation    as scribed in my presence :              Aydee Cardenas is doing well  X-rays demonstrate good fracture healing  X-rays demonstrate no fractures or dislocations He has good range of motion on exam   DRUJ stable in pronation supination He was instructed to continue to work on supination  He is non tender over the fracture site  He has no restrictions at this time  He may return to work and a note was provided for this today  He was instructed to continue with the use of the cock-up wrist brace for the next 4 weeks  I do not believe his subluxation of the DRUJ is causing him any issue  He only noticed this recently  He does claim he may have injured himself after the surgery  He was instructed to wear this only while out in the community  He will follow up in 6 weeks for repeat evaluation  Subjective:   Alberto Frazier is a 37 y o  male who presents to the office today for follow up evaluation 10 weeks s/p ORIF right distal radius performed on 8/21/19  Patient states he is doing well postoperatively  He states he has been compliant with formal physical therapy  He notes a "popping" with wrist extension  Patient states he just noticed this today  Patient states 3 weeks postoperatively he went to open a door when he felt a pop in the wrist  He denies any pain  He denies any numbness or tingling  Review of Systems   Constitutional: Negative for chills and fever  HENT: Negative for drooling and sneezing  Eyes: Negative for redness  Respiratory: Negative for cough and wheezing  Gastrointestinal: Negative for nausea and vomiting  Musculoskeletal: Negative for arthralgias, joint swelling and myalgias  Neurological: Negative for weakness and numbness  Psychiatric/Behavioral: Negative for behavioral problems  The patient is not nervous/anxious  Past Medical History:   Diagnosis Date    GERD (gastroesophageal reflux disease)     Radius/ulna fracture 08/2019    Right    Small bowel obstruction (HCC)        Past Surgical History:   Procedure Laterality Date    [place] OPEN RX DISTAL RADIUS FX, EXTRA-ARTICULAR Right 8/21/2019    Procedure: OPEN REDUCTION W/ INTERNAL FIXATION (ORIF) RADIUS / ULNA (WRIST); Surgeon: Sona Connolly DO;  Location: [facility];  Service: Orthopedics    WISDOM TOOTH EXTRACTION         Family History   Problem Relation Age of Onset    Arthritis Mother     Asthma Sister     No Known Problems Son        Social History     Occupational History    Not on file   Tobacco Use    Smoking status: Never Smoker    Smokeless tobacco: Never Used   Substance and Sexual Activity    Alcohol use: Yes     Frequency: 2-4 times a month     Comment: rare    Drug use: No    Sexual activity: Not on file         Current Outpatient Medications:     omeprazole (PriLOSEC) 10 mg delayed release capsule, Take 10 mg by mouth daily Take 1 pill 5 days a week, Disp: , Rfl:     Polyethylene Glycol 3350 (DULCOLAX BALANCE PO), Take by mouth 3 (three) times a week , Disp: , Rfl:     No Known Allergies    Objective: There were no vitals filed for this visit  Ortho Exam     Right wrist    Ext 90  Flex 90  DURJ stable pronation and supination  NTTP fx site  Scar well healed  Compartments soft  Brisk capillary refill  S/m intact median, radial, and ulnar nerve   Patient can voluntarily sublux his DRUJ without pain   Physical Exam   Constitutional: He is oriented to person, place, and time  He appears well-developed and well-nourished  HENT:   Head: Normocephalic and atraumatic  Eyes: Conjunctivae are normal  Right eye exhibits no discharge  Left eye exhibits no discharge     Neck: Normal range of motion  Neck supple  Cardiovascular: Normal rate and intact distal pulses  Pulmonary/Chest: Effort normal  No respiratory distress  Musculoskeletal:   As noted in HPI   Neurological: He is alert and oriented to person, place, and time  Skin: Skin is warm and dry  Psychiatric: He has a normal mood and affect  His behavior is normal  Judgment and thought content normal        I have personally reviewed pertinent films in PACS and my interpretation is as follows:X-ray right wrist performed in the office today demonstrates healing fracture and stable hardware

## 2019-10-30 ENCOUNTER — APPOINTMENT (OUTPATIENT)
Dept: OCCUPATIONAL THERAPY | Facility: CLINIC | Age: 39
End: 2019-10-30
Payer: OTHER MISCELLANEOUS

## 2019-12-16 ENCOUNTER — OFFICE VISIT (OUTPATIENT)
Dept: OBGYN CLINIC | Facility: CLINIC | Age: 39
End: 2019-12-16
Payer: OTHER MISCELLANEOUS

## 2019-12-16 VITALS
SYSTOLIC BLOOD PRESSURE: 115 MMHG | WEIGHT: 185 LBS | HEIGHT: 70 IN | DIASTOLIC BLOOD PRESSURE: 80 MMHG | BODY MASS INDEX: 26.48 KG/M2 | HEART RATE: 50 BPM

## 2019-12-16 DIAGNOSIS — Z87.81 S/P ORIF (OPEN REDUCTION INTERNAL FIXATION) FRACTURE: Primary | ICD-10-CM

## 2019-12-16 DIAGNOSIS — Z98.890 S/P ORIF (OPEN REDUCTION INTERNAL FIXATION) FRACTURE: Primary | ICD-10-CM

## 2019-12-16 PROCEDURE — 99213 OFFICE O/P EST LOW 20 MIN: CPT | Performed by: ORTHOPAEDIC SURGERY

## 2019-12-16 NOTE — PROGRESS NOTES
ASSESSMENT/PLAN:      37 y o  male aprox  4 months s/p right distal radius ORIF DOS 8/21/19  Overall Priya Toribio is doing well  Activities as tolerated  May d/c the use of the wrist brace all together  He was advised today that his scar will continue to heel and thin out  The plate may be removed in the future if it becomes bothersome  Follow up as needed if symptoms worse or fail to improve  The patient verbalized understanding of exam findings and treatment plan  We engaged in the shared decision-making process and treatment options were discussed at length with the patient  Surgical and conservative management discussed today along with risks and benefits  Diagnoses and all orders for this visit:    S/P ORIF (open reduction internal fixation) fracture        Follow Up:  Return if symptoms worsen or fail to improve  To Do Next Visit:  Re-evaluation of current issue    ____________________________________________________________________________________________________________________________________________      CHIEF COMPLAINT:  Chief Complaint   Patient presents with    Right Wrist - Fracture, Follow-up       SUBJECTIVE:  Mary Lou Gill is a 37y o  year old  male who presents to the office today aprox  4 months s/p right distal radius ORIF  Overall Priya Toribio is doing well  He has been compliant with the use of his cock up wrist brace  He has returned to work and denies any issues while working  He states that the first few weeks at work, he began to experience left wrist pain due to over compensation  At this time he denies any pain  I have personally reviewed all the relevant PMH, PSH, SH, FH, Medications and allergies       PAST MEDICAL HISTORY:  Past Medical History:   Diagnosis Date    GERD (gastroesophageal reflux disease)     Radius/ulna fracture 08/2019    Right    Small bowel obstruction ([address] )        PAST SURGICAL HISTORY:  Past Surgical History:   Procedure Laterality Date    [place] OPEN RX DISTAL RADIUS FX, EXTRA-ARTICULAR Right 8/21/2019    Procedure: OPEN REDUCTION W/ INTERNAL FIXATION (ORIF) RADIUS / ULNA (WRIST); Surgeon: Braxton Hu DO;  Location: [facility];  Service: Orthopedics    WISDOM TOOTH EXTRACTION         FAMILY HISTORY:  Family History   Problem Relation Age of Onset    Arthritis Mother     Asthma Sister     No Known Problems Son        SOCIAL HISTORY:  Social History     Tobacco Use    Smoking status: Never Smoker    Smokeless tobacco: Never Used   Substance Use Topics    Alcohol use: Yes     Frequency: 2-4 times a month     Comment: rare    Drug use: No       MEDICATIONS:    Current Outpatient Medications:     omeprazole (PriLOSEC) 10 mg delayed release capsule, Take 10 mg by mouth daily Take 1 pill 5 days a week, Disp: , Rfl:     Polyethylene Glycol 3350 (DULCOLAX BALANCE PO), Take by mouth 3 (three) times a week , Disp: , Rfl:     ALLERGIES:  No Known Allergies    REVIEW OF SYSTEMS:  Review of Systems   Constitutional: Negative for chills, fever and unexpected weight change  HENT: Negative for hearing loss, nosebleeds and sore throat  Eyes: Negative for pain, redness and visual disturbance  Respiratory: Negative for cough, shortness of breath and wheezing  Cardiovascular: Negative for chest pain, palpitations and leg swelling  Gastrointestinal: Negative for abdominal pain, nausea and vomiting  Endocrine: Negative for polydipsia and polyuria  Genitourinary: Negative for difficulty urinating and hematuria  Musculoskeletal: Negative for arthralgias, joint swelling and myalgias  Skin: Negative for rash and wound  Neurological: Negative for dizziness, numbness and headaches  Psychiatric/Behavioral: Negative for decreased concentration, dysphoric mood and suicidal ideas  The patient is not nervous/anxious          VITALS:  Vitals:    12/16/19 1218   BP: 115/80   Pulse: (!) 50       LABS:  HgA1c: No results found for: HGBA1C  BMP:   Lab Results   Component Value Date    CALCIUM 9 1 08/08/2019     07/31/2017    K 3 4 (L) 08/08/2019    CO2 29 08/08/2019     08/08/2019    BUN 9 08/08/2019    CREATININE 0 96 08/08/2019       _____________________________________________________  PHYSICAL EXAMINATION:  General: well developed and well nourished, alert, oriented times 3 and appears comfortable  Psychiatric: Normal  HEENT: Normocephalic, Atraumatic Trachea Midline, No torticollis  Pulmonary: No audible wheezing or respiratory distress   Cardiovascular: No pitting edema, 2+ radial pulse   Skin: No masses, erythema, lacerations, fluctation, ulcerations  Neurovascular: Sensation Intact to the Median, Ulnar, Radial Nerve, Motor Intact to the Median, Ulnar, Radial Nerve and Pulses Intact  Musculoskeletal: Normal, except as noted in detailed exam and in HPI  MUSCULOSKELETAL EXAMINATION:    Right wrist:     No erythema  No ecchymosis   Well healed surgical incision   Supination aprox  75 degree's   Pronation full   Flexion aprox  70 degree's  Ulnar and radial deviation aprox  20 degree's   DRUJ stable in pronation and supination   No tenderness   Full composite fist   5/5 Motor to the APB, FDI, FDP2, FDP5, EDC  Sensation intact to light touch in the median, radial, and ulnar nerve distribution      ___________________________________________________  STUDIES REVIEWED:  No new imaging to review           PROCEDURES PERFORMED:  Procedures  No Procedures performed today    _____________________________________________________      Brittni Post    I,:   Juan Robertson am acting as a scribe while in the presence of the attending physician :        I,:   Aman Darden DO personally performed the services described in this documentation    as scribed in my presence :

## 2019-12-16 NOTE — PROGRESS NOTES
Assessment/Plan:  1  S/P ORIF (open reduction internal fixation) fracture         Scribe Attestation    I,:   Eve Robertson MA am acting as a scribe while in the presence of the attending physician :        I,:   Camila Boss DO personally performed the services described in this documentation    as scribed in my presence :              ***    Subjective:   Judy Fletcher is a 37 y o  male who presents to the office today for follow up evaluation appx 4 months s/p ORIF right wrist performed on 8/21/19  Patient states he is doing well  Review of Systems   Constitutional: Negative for chills and fever  HENT: Negative for drooling and sneezing  Eyes: Negative for redness  Respiratory: Negative for cough and wheezing  Gastrointestinal: Negative for nausea and vomiting  Musculoskeletal: Negative for arthralgias, joint swelling and myalgias  Neurological: Negative for weakness and numbness  Psychiatric/Behavioral: Negative for behavioral problems  The patient is not nervous/anxious  Past Medical History:   Diagnosis Date    GERD (gastroesophageal reflux disease)     Radius/ulna fracture 08/2019    Right    Small bowel obstruction (HCC)        Past Surgical History:   Procedure Laterality Date    [place] OPEN RX DISTAL RADIUS FX, EXTRA-ARTICULAR Right 8/21/2019    Procedure: OPEN REDUCTION W/ INTERNAL FIXATION (ORIF) RADIUS / ULNA (WRIST);   Surgeon: Camila Boss DO;  Location: [facility];  Service: Orthopedics    WISDOM TOOTH EXTRACTION         Family History   Problem Relation Age of Onset    Arthritis Mother     Asthma Sister     No Known Problems Son        Social History     Occupational History    Not on file   Tobacco Use    Smoking status: Never Smoker    Smokeless tobacco: Never Used   Substance and Sexual Activity    Alcohol use: Yes     Frequency: 2-4 times a month     Comment: rare    Drug use: No    Sexual activity: Not on file         Current Outpatient Medications:   omeprazole (PriLOSEC) 10 mg delayed release capsule, Take 10 mg by mouth daily Take 1 pill 5 days a week, Disp: , Rfl:     Polyethylene Glycol 3350 (DULCOLAX BALANCE PO), Take by mouth 3 (three) times a week , Disp: , Rfl:     No Known Allergies    Objective: There were no vitals filed for this visit  Ortho Exam     Right wrist    Incision well healed  Compartments soft  Brisk capillary refill  S/m intact median, radial, and ulnar nerve     Physical Exam   Constitutional: He is oriented to person, place, and time  He appears well-developed and well-nourished  HENT:   Head: Normocephalic and atraumatic  Eyes: Conjunctivae are normal  Right eye exhibits no discharge  Left eye exhibits no discharge  Neck: Normal range of motion  Neck supple  Cardiovascular: Normal rate and intact distal pulses  Pulmonary/Chest: Effort normal  No respiratory distress  Musculoskeletal:   As noted in HPI   Neurological: He is alert and oriented to person, place, and time  Skin: Skin is warm and dry  Psychiatric: He has a normal mood and affect   His behavior is normal  Judgment and thought content normal        I have personally reviewed pertinent films in PACS and my interpretation is as follows:  ***

## 2020-01-02 ENCOUNTER — OFFICE VISIT (OUTPATIENT)
Dept: OTOLARYNGOLOGY | Facility: CLINIC | Age: 40
End: 2020-01-02
Payer: OTHER MISCELLANEOUS

## 2020-01-02 VITALS
HEART RATE: 73 BPM | SYSTOLIC BLOOD PRESSURE: 122 MMHG | RESPIRATION RATE: 12 BRPM | TEMPERATURE: 98.4 F | BODY MASS INDEX: 27.49 KG/M2 | DIASTOLIC BLOOD PRESSURE: 74 MMHG | WEIGHT: 192 LBS | HEIGHT: 70 IN

## 2020-01-02 DIAGNOSIS — J34.3 NASAL TURBINATE HYPERTROPHY: ICD-10-CM

## 2020-01-02 DIAGNOSIS — J34.2 NASAL SEPTAL DEVIATION: Primary | ICD-10-CM

## 2020-01-02 PROCEDURE — 99204 OFFICE O/P NEW MOD 45 MIN: CPT | Performed by: OTOLARYNGOLOGY

## 2020-01-02 NOTE — PROGRESS NOTES
[facility]  [facility]  [address] Otolaryngology      Otolaryngology -- Head and Neck Surgery New Patient Visit    Isabella Hermosillo is a 37 y o  who presents with a chief complaint of broken nose    Pertinent elements of the history include:  He presents following nasal trauma in July  He broke his nose when slipping off a trailer at that time  Also suffered a wrist fracture from the same incident  He saw ENT initially after the incident who diagnosed him with a deviated septum  He has right sided nasal obstruction that has been persistent since the accident  He does not feel that the outside of his nose has changed following the accident  No epistaxis  He has a history of prior "sinus issues" and nasal congestion, but his symptoms of nasal congestion have become a constant, daily problem since this accident  He has a history of using nasal decongestants to relieve his nasal obstruction, but only "once in a great while "  Using it "every other week "  He has never tried nasal steroids  He has used nasal saline spray but this does not help  Nasal decongestants (Dristan) are the only thing that helps, but he states he uses this very infrequently  Review of systems Review of systems reviewed, as documented on a separate form  All other systems reviewed, are negative  Results reviewed; images from any scan have been personally reviewed: The past medical, surgical, social and family history have been reviewed as documented in today's record  Past Medical History:   Diagnosis Date    GERD (gastroesophageal reflux disease)     Radius/ulna fracture 08/2019    Right    Small bowel obstruction (HCC)        Past Surgical History:   Procedure Laterality Date    ME OPEN RX DISTAL RADIUS FX, EXTRA-ARTICULAR Right 8/21/2019    Procedure: OPEN REDUCTION W/ INTERNAL FIXATION (ORIF) RADIUS / ULNA (WRIST);   Surgeon: Yesica Rodriguez DO;  Location: [address] [place] [address];  Service: Orthopedics    WISDOM TOOTH EXTRACTION         Family History   Problem Relation Age of Onset    Arthritis Mother     Asthma Sister     No Known Problems Son        Social History     Socioeconomic History    Marital status: /Civil Union     Spouse name: Not on file    Number of children: Not on file    Years of education: Not on file    Highest education level: Not on file   Occupational History    Not on file   Social Needs    Financial resource strain: Not on file    Food insecurity:     Worry: Not on file     Inability: Not on file    Transportation needs:     Medical: Not on file     Non-medical: Not on file   Tobacco Use    Smoking status: Never Smoker    Smokeless tobacco: Never Used   Substance and Sexual Activity    Alcohol use: Yes     Frequency: 2-4 times a month     Comment: rare    Drug use: No    Sexual activity: Not on file   Lifestyle    Physical activity:     Days per week: Not on file     Minutes per session: Not on file    Stress: Not on file   Relationships    Social connections:     Talks on phone: Not on file     Gets together: Not on file     Attends [employer] service: Not on file     Active member of club or organization: Not on file     Attends meetings of clubs or organizations: Not on file     Relationship status: Not on file    Intimate partner violence:     Fear of current or ex partner: Not on file     Emotionally abused: Not on file     Physically abused: Not on file     Forced sexual activity: Not on file   Other Topics Concern    Not on file   Social History Narrative    Not on file       Current Outpatient Medications on File Prior to Visit   Medication Sig Dispense Refill    omeprazole (PriLOSEC) 10 mg delayed release capsule Take 10 mg by mouth daily Take 1 pill 5 days a week      Polyethylene Glycol 3350 (DULCOLAX BALANCE PO) Take by mouth 3 (three) times a week        No current facility-administered medications on file prior to visit  Physical exam: (abnormal findings appear in bold and supercede any conflicting normal findings listed below)    /74 (BP Location: Left arm, Patient Position: Sitting, Cuff Size: Standard)   Pulse 73   Temp 98 4 °F (36 9 °C) (Oral)   Resp 12   Ht 5' 10" (1 778 m)   Wt 87 1 kg (192 lb)   BMI 27 55 kg/m²     Constitutional:  Well developed, well nourished and groomed, in no acute distress  Eyes:  Extra-ocular movements intact, pupils equally round and reactive to light and accommodation, the lids and conjunctivae are normal in appearance  Head: Atraumatic, normocephalic, no visible scalp lesions, bony palpation unremarkable without stepoffs, parotid and submandibular salivary glands non-tender to palpation and without masses bilaterally  Ears:  Auricles normal in appearance bilaterally, mastoid prominence non-tender, external auditory canals clear bilaterally, tympanic membranes intact bilaterally without evidence of middle ear effusion or masses, normal appearing ossicles  Nose/Sinuses:  External appearance unremarkable, no maxillary or frontal sinus tenderness to palpation bilaterally  Anterior rhinoscopy reveals: a right septal deviation with 90% right sided obstruction, mucosal turbinate edema and erythema and enlargement  Oral Cavity:  Moist mucus membranes, gums unremarkable, no oral mucosal masses or lesions, floor of mouth soft, tongue mobile without masses or lesions  Absent upper arch front dentition  Poor dentition  Oropharynx:  Base of tongue soft and without masses, tonsils bilaterally unremarkable, soft palate mucosa unremarkable  Neck:  No visible or palpable cervical lesions or lymphadenopathy, thyroid gland is normal in size and symmetry and without masses, normal laryngeal elevation with swallowing  Cardiovascular:  Normal rate and rhythm, no palpable thrills, no jugulovenous distension observed    Respiratory:  Normal respiratory effort without evidence of retractions or use of accessory muscles  Integument:  Normal appearing without observed masses or lesions  Neurologic:  Cranial nerves II-XII intact bilaterally  Psychiatric:  Alert and oriented to time, place and person, normal affect  Procedures      Assessment:   1  Nasal septal deviation     2  Nasal turbinate hypertrophy         Orders  No orders of the defined types were placed in this encounter  Discussion/Plan:    1  He has a history of nasal obstruction due to cartilaginous nasal trauma following a fall in July  His underlying nasal congestion has been made worse by this fall and has a resultant deviated septum  Therefore, I recommended a septoplasty and bilateral inferior turbinate reduction  The risks, benefits and alternatives and surgery were discussed and his informed consent was obtained  Those risks include bleeding, infection, recurrence, scarring, need for more surgery, saddle nose deformity and septal perforation  He will follow up in the operating room and we will work on obtaining authorization from [employer]  Thank you for allowing me to participate in the care of your patient

## 2020-01-16 PROBLEM — J34.2 NASAL SEPTAL DEVIATION: Status: ACTIVE | Noted: 2020-01-16

## 2020-01-16 PROBLEM — J34.3 NASAL TURBINATE HYPERTROPHY: Status: ACTIVE | Noted: 2020-01-16

## 2020-02-07 ENCOUNTER — APPOINTMENT (OUTPATIENT)
Dept: LAB | Facility: HOSPITAL | Age: 40
End: 2020-02-07
Attending: OTOLARYNGOLOGY
Payer: OTHER MISCELLANEOUS

## 2020-02-07 ENCOUNTER — TRANSCRIBE ORDERS (OUTPATIENT)
Dept: ADMINISTRATIVE | Facility: HOSPITAL | Age: 40
End: 2020-02-07

## 2020-02-07 DIAGNOSIS — J34.3 NASAL TURBINATE HYPERTROPHY: ICD-10-CM

## 2020-02-07 DIAGNOSIS — J34.2 NASAL SEPTAL DEVIATION: ICD-10-CM

## 2020-02-07 LAB
ANION GAP SERPL CALCULATED.3IONS-SCNC: 5 MMOL/L (ref 4–13)
BASOPHILS # BLD AUTO: 0.06 THOUSANDS/ΜL (ref 0–0.1)
BASOPHILS NFR BLD AUTO: 1 % (ref 0–1)
BUN SERPL-MCNC: 10 MG/DL (ref 5–25)
CALCIUM SERPL-MCNC: 8.8 MG/DL (ref 8.3–10.1)
CHLORIDE SERPL-SCNC: 103 MMOL/L (ref 100–108)
CO2 SERPL-SCNC: 29 MMOL/L (ref 21–32)
CREAT SERPL-MCNC: 0.91 MG/DL (ref 0.6–1.3)
EOSINOPHIL # BLD AUTO: 0.09 THOUSAND/ΜL (ref 0–0.61)
EOSINOPHIL NFR BLD AUTO: 2 % (ref 0–6)
ERYTHROCYTE [DISTWIDTH] IN BLOOD BY AUTOMATED COUNT: 13.2 % (ref 11.6–15.1)
GFR SERPL CREATININE-BSD FRML MDRD: 104 ML/MIN/1.73SQ M
GLUCOSE SERPL-MCNC: 89 MG/DL (ref 65–140)
HCT VFR BLD AUTO: 44.3 % (ref 36.5–49.3)
HGB BLD-MCNC: 14.5 G/DL (ref 12–17)
IMM GRANULOCYTES # BLD AUTO: 0.01 THOUSAND/UL (ref 0–0.2)
IMM GRANULOCYTES NFR BLD AUTO: 0 % (ref 0–2)
LYMPHOCYTES # BLD AUTO: 2.02 THOUSANDS/ΜL (ref 0.6–4.47)
LYMPHOCYTES NFR BLD AUTO: 36 % (ref 14–44)
MCH RBC QN AUTO: 28.5 PG (ref 26.8–34.3)
MCHC RBC AUTO-ENTMCNC: 32.7 G/DL (ref 31.4–37.4)
MCV RBC AUTO: 87 FL (ref 82–98)
MONOCYTES # BLD AUTO: 0.6 THOUSAND/ΜL (ref 0.17–1.22)
MONOCYTES NFR BLD AUTO: 11 % (ref 4–12)
NEUTROPHILS # BLD AUTO: 2.82 THOUSANDS/ΜL (ref 1.85–7.62)
NEUTS SEG NFR BLD AUTO: 50 % (ref 43–75)
NRBC BLD AUTO-RTO: 0 /100 WBCS
PLATELET # BLD AUTO: 301 THOUSANDS/UL (ref 149–390)
PMV BLD AUTO: 9.6 FL (ref 8.9–12.7)
POTASSIUM SERPL-SCNC: 3.8 MMOL/L (ref 3.5–5.3)
RBC # BLD AUTO: 5.09 MILLION/UL (ref 3.88–5.62)
SODIUM SERPL-SCNC: 137 MMOL/L (ref 136–145)
WBC # BLD AUTO: 5.6 THOUSAND/UL (ref 4.31–10.16)

## 2020-02-07 PROCEDURE — 36415 COLL VENOUS BLD VENIPUNCTURE: CPT

## 2020-02-07 PROCEDURE — 85025 COMPLETE CBC W/AUTO DIFF WBC: CPT

## 2020-02-07 PROCEDURE — 80048 BASIC METABOLIC PNL TOTAL CA: CPT

## 2020-02-13 NOTE — PRE-PROCEDURE INSTRUCTIONS
My Surgical Experience    The following information was developed to assist you to prepare for your operation  What do I need to do before coming to the hospital?   Arrange for a responsible person to drive you to and from the hospital    Arrange care for your children at home  Children are not allowed in the recovery areas of the hospital   Plan to wear clothing that is easy to put on and take off  If you are having shoulder surgery, wear a shirt that buttons or zippers in the front  Bathing  o Shower the evening before and the morning of your surgery with an antibacterial soap  Please refer to the Pre Op Showering Instructions for Surgery Patients Sheet   o Remove nail polish and all body piercing jewelry  o Do not shave any body part for at least 24 hours before surgery-this includes face, arms, legs and upper body  Food  o Nothing to eat or drink after midnight the night before your surgery  This includes candy and chewing gum  o Exception: If your surgery is after 12:00pm (noon), you may have clear liquids such as 7-Up®, ginger ale, apple or cranberry juice, Jell-O®, water, or clear broth until 8:00 am  o Do not drink milk or juice with pulp on the morning before surgery  o Do not drink alcohol 24 hours before surgery  Medicine  o Follow instructions you received from your surgeon about which medicines you may take on the day of surgery  o If instructed to take medicine on the morning of surgery, take pills with just a small sip of water  Call your prescribing doctor for specific infroamtion on what to do if you take insulin    What should I bring to the hospital?    Bring:  Red Flowers or a walker, if you have them, for foot or knee surgery   A list of the daily medicines, vitamins, minerals, herbals and nutritional supplements you take   Include the dosages of medicines and the time you take them each day   Glasses, dentures or hearing aids   Minimal clothing; you will be wearing hospital sleepwear   Photo ID; required to verify your identity   If you have a Living Will or Power of , bring a copy of the documents   If you have an ostomy, bring an extra pouch and any supplies you use    Do not bring   Medicines or inhalers   Money, valuables or jewelry    What other information should I know about the day of surgery?  Notify your surgeons if you develop a cold, sore throat, cough, fever, rash or any other illness   Report to the Ambulatory Surgical/Same Day Surgery Unit   You will be instructed to stop at Registration only if you have not been pre-registered   Inform your  fi they do not stay that they will be asked by the staff to leave a phone number where they can be reached   Be available to be reached before surgery  In the event the operating room schedule changes, you may be asked to come in earlier or later than expected    *It is important to tell your doctor and others involved in your health care if you are taking or have been taking any non-prescription drugs, vitamins, minerals, herbals or other nutritional supplements  Any of these may interact with some food or medicines and cause a reaction      Pre-Surgery Instructions:   Medication Instructions    omeprazole (PriLOSEC) 10 mg delayed release capsule Instructed patient per Anesthesia Guidelines   Polyethylene Glycol 3350 (DULCOLAX BALANCE PO) Instructed patient per Anesthesia Guidelines  To take prilosec a m  Of surgery

## 2020-02-18 ENCOUNTER — ANESTHESIA EVENT (OUTPATIENT)
Dept: PERIOP | Facility: HOSPITAL | Age: 40
End: 2020-02-18
Payer: OTHER MISCELLANEOUS

## 2020-02-20 ENCOUNTER — HOSPITAL ENCOUNTER (OUTPATIENT)
Facility: HOSPITAL | Age: 40
Setting detail: OUTPATIENT SURGERY
Discharge: HOME | End: 2020-02-20
Attending: OTOLARYNGOLOGY | Admitting: OTOLARYNGOLOGY
Payer: OTHER MISCELLANEOUS

## 2020-02-20 ENCOUNTER — ANESTHESIA (OUTPATIENT)
Dept: PERIOP | Facility: HOSPITAL | Age: 40
End: 2020-02-20
Payer: OTHER MISCELLANEOUS

## 2020-02-20 VITALS
HEART RATE: 61 BPM | WEIGHT: 191.25 LBS | BODY MASS INDEX: 27.38 KG/M2 | DIASTOLIC BLOOD PRESSURE: 75 MMHG | RESPIRATION RATE: 18 BRPM | SYSTOLIC BLOOD PRESSURE: 118 MMHG | TEMPERATURE: 97.2 F | HEIGHT: 70 IN | OXYGEN SATURATION: 95 %

## 2020-02-20 DIAGNOSIS — J34.3 NASAL TURBINATE HYPERTROPHY: ICD-10-CM

## 2020-02-20 DIAGNOSIS — J34.2 NASAL SEPTAL DEVIATION: Primary | ICD-10-CM

## 2020-02-20 PROCEDURE — 30520 REPAIR OF NASAL SEPTUM: CPT | Performed by: OTOLARYNGOLOGY

## 2020-02-20 PROCEDURE — 30140 RESECT INFERIOR TURBINATE: CPT | Performed by: OTOLARYNGOLOGY

## 2020-02-20 RX ORDER — MAGNESIUM HYDROXIDE 1200 MG/15ML
LIQUID ORAL AS NEEDED
Status: DISCONTINUED | OUTPATIENT
Start: 2020-02-20 | End: 2020-02-20 | Stop reason: HOSPADM

## 2020-02-20 RX ORDER — ONDANSETRON 2 MG/ML
4 INJECTION INTRAMUSCULAR; INTRAVENOUS ONCE AS NEEDED
Status: DISCONTINUED | OUTPATIENT
Start: 2020-02-20 | End: 2020-02-20 | Stop reason: HOSPADM

## 2020-02-20 RX ORDER — LIDOCAINE HYDROCHLORIDE AND EPINEPHRINE 10; 10 MG/ML; UG/ML
INJECTION, SOLUTION INFILTRATION; PERINEURAL AS NEEDED
Status: DISCONTINUED | OUTPATIENT
Start: 2020-02-20 | End: 2020-02-20 | Stop reason: HOSPADM

## 2020-02-20 RX ORDER — LIDOCAINE HYDROCHLORIDE 10 MG/ML
INJECTION, SOLUTION EPIDURAL; INFILTRATION; INTRACAUDAL; PERINEURAL AS NEEDED
Status: DISCONTINUED | OUTPATIENT
Start: 2020-02-20 | End: 2020-02-20 | Stop reason: SURG

## 2020-02-20 RX ORDER — DEXAMETHASONE SODIUM PHOSPHATE 4 MG/ML
INJECTION, SOLUTION INTRA-ARTICULAR; INTRALESIONAL; INTRAMUSCULAR; INTRAVENOUS; SOFT TISSUE AS NEEDED
Status: DISCONTINUED | OUTPATIENT
Start: 2020-02-20 | End: 2020-02-20 | Stop reason: SURG

## 2020-02-20 RX ORDER — HYDROCODONE BITARTRATE AND ACETAMINOPHEN 5; 325 MG/1; MG/1
1 TABLET ORAL EVERY 6 HOURS PRN
Qty: 15 TABLET | Refills: 0 | Status: SHIPPED | OUTPATIENT
Start: 2020-02-20 | End: 2020-03-01

## 2020-02-20 RX ORDER — BACITRACIN, NEOMYCIN, POLYMYXIN B 400; 3.5; 5 [USP'U]/G; MG/G; [USP'U]/G
OINTMENT TOPICAL AS NEEDED
Status: DISCONTINUED | OUTPATIENT
Start: 2020-02-20 | End: 2020-02-20 | Stop reason: HOSPADM

## 2020-02-20 RX ORDER — HYDROCODONE BITARTRATE AND ACETAMINOPHEN 5; 325 MG/1; MG/1
1 TABLET ORAL EVERY 6 HOURS PRN
Status: DISCONTINUED | OUTPATIENT
Start: 2020-02-20 | End: 2020-02-20 | Stop reason: HOSPADM

## 2020-02-20 RX ORDER — ONDANSETRON 2 MG/ML
INJECTION INTRAMUSCULAR; INTRAVENOUS AS NEEDED
Status: DISCONTINUED | OUTPATIENT
Start: 2020-02-20 | End: 2020-02-20 | Stop reason: SURG

## 2020-02-20 RX ORDER — FENTANYL CITRATE/PF 50 MCG/ML
50 SYRINGE (ML) INJECTION
Status: DISCONTINUED | OUTPATIENT
Start: 2020-02-20 | End: 2020-02-20 | Stop reason: HOSPADM

## 2020-02-20 RX ORDER — AMOXICILLIN AND CLAVULANATE POTASSIUM 875; 125 MG/1; MG/1
1 TABLET, FILM COATED ORAL EVERY 12 HOURS SCHEDULED
Qty: 28 TABLET | Refills: 0 | Status: SHIPPED | OUTPATIENT
Start: 2020-02-20 | End: 2020-03-05

## 2020-02-20 RX ORDER — NEOSTIGMINE METHYLSULFATE 1 MG/ML
INJECTION INTRAVENOUS AS NEEDED
Status: DISCONTINUED | OUTPATIENT
Start: 2020-02-20 | End: 2020-02-20 | Stop reason: SURG

## 2020-02-20 RX ORDER — PROPOFOL 10 MG/ML
INJECTION, EMULSION INTRAVENOUS AS NEEDED
Status: DISCONTINUED | OUTPATIENT
Start: 2020-02-20 | End: 2020-02-20 | Stop reason: SURG

## 2020-02-20 RX ORDER — SODIUM CHLORIDE, SODIUM LACTATE, POTASSIUM CHLORIDE, CALCIUM CHLORIDE 600; 310; 30; 20 MG/100ML; MG/100ML; MG/100ML; MG/100ML
125 INJECTION, SOLUTION INTRAVENOUS CONTINUOUS
Status: DISCONTINUED | OUTPATIENT
Start: 2020-02-20 | End: 2020-02-20 | Stop reason: SDUPTHER

## 2020-02-20 RX ORDER — ACETAMINOPHEN 325 MG/1
650 TABLET ORAL EVERY 6 HOURS PRN
Status: DISCONTINUED | OUTPATIENT
Start: 2020-02-20 | End: 2020-02-20 | Stop reason: HOSPADM

## 2020-02-20 RX ORDER — SODIUM CHLORIDE, SODIUM LACTATE, POTASSIUM CHLORIDE, CALCIUM CHLORIDE 600; 310; 30; 20 MG/100ML; MG/100ML; MG/100ML; MG/100ML
125 INJECTION, SOLUTION INTRAVENOUS CONTINUOUS
Status: DISCONTINUED | OUTPATIENT
Start: 2020-02-20 | End: 2020-02-20 | Stop reason: HOSPADM

## 2020-02-20 RX ORDER — FENTANYL CITRATE 50 UG/ML
INJECTION, SOLUTION INTRAMUSCULAR; INTRAVENOUS AS NEEDED
Status: DISCONTINUED | OUTPATIENT
Start: 2020-02-20 | End: 2020-02-20 | Stop reason: SURG

## 2020-02-20 RX ORDER — SCOLOPAMINE TRANSDERMAL SYSTEM 1 MG/1
1 PATCH, EXTENDED RELEASE TRANSDERMAL
Status: DISCONTINUED | OUTPATIENT
Start: 2020-02-20 | End: 2020-02-20 | Stop reason: HOSPADM

## 2020-02-20 RX ORDER — MIDAZOLAM HYDROCHLORIDE 2 MG/2ML
INJECTION, SOLUTION INTRAMUSCULAR; INTRAVENOUS AS NEEDED
Status: DISCONTINUED | OUTPATIENT
Start: 2020-02-20 | End: 2020-02-20 | Stop reason: SURG

## 2020-02-20 RX ORDER — GLYCOPYRROLATE 0.2 MG/ML
INJECTION INTRAMUSCULAR; INTRAVENOUS AS NEEDED
Status: DISCONTINUED | OUTPATIENT
Start: 2020-02-20 | End: 2020-02-20 | Stop reason: SURG

## 2020-02-20 RX ORDER — ROCURONIUM BROMIDE 10 MG/ML
INJECTION, SOLUTION INTRAVENOUS AS NEEDED
Status: DISCONTINUED | OUTPATIENT
Start: 2020-02-20 | End: 2020-02-20 | Stop reason: SURG

## 2020-02-20 RX ADMIN — FENTANYL CITRATE 100 MCG: 50 INJECTION, SOLUTION INTRAMUSCULAR; INTRAVENOUS at 07:39

## 2020-02-20 RX ADMIN — ONDANSETRON 4 MG: 2 INJECTION INTRAMUSCULAR; INTRAVENOUS at 08:19

## 2020-02-20 RX ADMIN — MIDAZOLAM HYDROCHLORIDE 2 MG: 1 INJECTION, SOLUTION INTRAMUSCULAR; INTRAVENOUS at 07:34

## 2020-02-20 RX ADMIN — ACETAMINOPHEN 650 MG: 325 TABLET, FILM COATED ORAL at 10:34

## 2020-02-20 RX ADMIN — PROPOFOL 200 MG: 10 INJECTION, EMULSION INTRAVENOUS at 07:39

## 2020-02-20 RX ADMIN — SCOPALAMINE 1 PATCH: 1 PATCH, EXTENDED RELEASE TRANSDERMAL at 07:31

## 2020-02-20 RX ADMIN — SODIUM CHLORIDE, SODIUM LACTATE, POTASSIUM CHLORIDE, AND CALCIUM CHLORIDE 125 ML/HR: .6; .31; .03; .02 INJECTION, SOLUTION INTRAVENOUS at 06:09

## 2020-02-20 RX ADMIN — NEOSTIGMINE METHYLSULFATE 4 MG: 1 INJECTION INTRAVENOUS at 08:14

## 2020-02-20 RX ADMIN — ROCURONIUM BROMIDE 40 MG: 10 INJECTION, SOLUTION INTRAVENOUS at 07:39

## 2020-02-20 RX ADMIN — GLYCOPYRROLATE 0.6 MG: 0.2 INJECTION, SOLUTION INTRAMUSCULAR; INTRAVENOUS at 08:14

## 2020-02-20 RX ADMIN — LIDOCAINE HYDROCHLORIDE 50 MG: 10 INJECTION, SOLUTION EPIDURAL; INFILTRATION; INTRACAUDAL; PERINEURAL at 07:39

## 2020-02-20 RX ADMIN — DEXAMETHASONE SODIUM PHOSPHATE 4 MG: 4 INJECTION, SOLUTION INTRA-ARTICULAR; INTRALESIONAL; INTRAMUSCULAR; INTRAVENOUS; SOFT TISSUE at 08:19

## 2020-02-20 RX ADMIN — SCOPALAMINE 1 PATCH: 1 PATCH, EXTENDED RELEASE TRANSDERMAL at 07:08

## 2020-02-20 NOTE — ANESTHESIA PREPROCEDURE EVALUATION
Review of Systems/Medical History  Patient summary reviewed    History of anesthetic complications PONV    Cardiovascular  EKG reviewed, Exercise tolerance (METS): >4,  No dysrhythmias , No angina ,    Pulmonary  Negative pulmonary ROS No shortness of breath,        GI/Hepatic    GERD well controlled,        Negative  ROS        Endo/Other  Negative endo/other ROS      GYN       Hematology  Negative hematology ROS      Musculoskeletal  Negative musculoskeletal ROS        Neurology  Negative neurology ROS      Psychology           Physical Exam    Airway    Mallampati score: I  TM Distance: <3 FB  Neck ROM: full     Dental   Comment: Upper partial denture ,     Cardiovascular  Rhythm: regular, Rate: normal,     Pulmonary  Pulmonary exam normal     Other Findings        Anesthesia Plan  ASA Score- 2     Anesthesia Type-   Additional Monitors:   Airway Plan: ETT  Plan Factors-  Patient did not smoke on day of surgery  Induction- intravenous  Postoperative Plan- Plan for postoperative opioid use  Planned trial extubation    Informed Consent- Anesthetic plan and risks discussed with patient  I personally reviewed this patient with the CRNA  Discussed and agreed on the Anesthesia Plan with the CRNA  Caren Calix

## 2020-02-20 NOTE — PERIOPERATIVE NURSING NOTE
Received patient to [facility] from PACU, alert, VSS  Pt reports 2/10 discomfort of nasal area  Small amount of blood dripping from nose; moustache dressing applied  Tolerating po fluids

## 2020-02-20 NOTE — H&P
Preoperative history and physical    Mary Lou Gill is a 37 y o  who presents with a chief complaint of broken nose     Pertinent elements of the history include:  He presents following nasal trauma in July  He broke his nose when slipping off a trailer at that time  Also suffered a wrist fracture from the same incident  He saw ENT initially after the incident who diagnosed him with a deviated septum  He has right sided nasal obstruction that has been persistent since the accident  He does not feel that the outside of his nose has changed following the accident  No epistaxis  He has a history of prior "sinus issues" and nasal congestion, but his symptoms of nasal congestion have become a constant, daily problem since this accident  He has a history of using nasal decongestants to relieve his nasal obstruction, but only "once in a great while "  Using it "every other week "  He has never tried nasal steroids  He has used nasal saline spray but this does not help  Nasal decongestants (Dristan) are the only thing that helps, but he states he uses this very infrequently         Review of systems Review of systems reviewed, as documented on a separate form  All other systems reviewed, are negative      Results reviewed; images from any scan have been personally reviewed:           The past medical, surgical, social and family history have been reviewed as documented in today's record      Medical History        Past Medical History:   Diagnosis Date    GERD (gastroesophageal reflux disease)      Radius/ulna fracture 08/2019     Right    Small bowel obstruction ([address] )              Surgical History         Past Surgical History:   Procedure Laterality Date    [place] OPEN RX DISTAL RADIUS FX, EXTRA-ARTICULAR Right 8/21/2019     Procedure: OPEN REDUCTION W/ INTERNAL FIXATION (ORIF) RADIUS / ULNA (WRIST);   Surgeon: Chris Maier DO;  Location: [facility];  Service: Orthopedics    WISDOM TOOTH EXTRACTION               Family History   Problem Relation Age of Onset    Arthritis Mother      Asthma Sister      No Known Problems Son           Social History               Socioeconomic History    Marital status: /Civil Union       Spouse name: Not on file    Number of children: Not on file    Years of education: Not on file    Highest education level: Not on file   Occupational History    Not on file   Social Needs    Financial resource strain: Not on file    Food insecurity:       Worry: Not on file       Inability: Not on file    Transportation needs:       Medical: Not on file       Non-medical: Not on file   Tobacco Use    Smoking status: Never Smoker    Smokeless tobacco: Never Used   Substance and Sexual Activity    Alcohol use:  Yes       Frequency: 2-4 times a month       Comment: rare    Drug use: No    Sexual activity: Not on file   Lifestyle    Physical activity:       Days per week: Not on file       Minutes per session: Not on file    Stress: Not on file   Relationships    Social connections:       Talks on phone: Not on file       Gets together: Not on file       Attends [employer] service: Not on file       Active member of club or organization: Not on file       Attends meetings of clubs or organizations: Not on file       Relationship status: Not on file    Intimate partner violence:       Fear of current or ex partner: Not on file       Emotionally abused: Not on file       Physically abused: Not on file       Forced sexual activity: Not on file   Other Topics Concern    Not on file   Social History Narrative    Not on file                   Current Outpatient Medications on File Prior to Visit   Medication Sig Dispense Refill    omeprazole (PriLOSEC) 10 mg delayed release capsule Take 10 mg by mouth daily Take 1 pill 5 days a week        Polyethylene Glycol 3350 (DULCOLAX BALANCE PO) Take by mouth 3 (three) times a week           No current facility-administered medications on file prior to visit           Physical exam: (abnormal findings appear in bold and supercede any conflicting normal findings listed below)    /83   Pulse 64   Temp (!) 95 6 °F (35 3 °C) (Tympanic)   Resp 18   Ht 5' 10" (1 778 m)   Wt 86 8 kg (191 lb 4 oz)   SpO2 97%   BMI 27 44 kg/m²        Constitutional:  Well developed, well nourished and groomed, in no acute distress       Eyes:  Extra-ocular movements intact, pupils equally round and reactive to light and accommodation, the lids and conjunctivae are normal in appearance      Head: Atraumatic, normocephalic, no visible scalp lesions, bony palpation unremarkable without stepoffs, parotid and submandibular salivary glands non-tender to palpation and without masses bilaterally       Ears:  Auricles normal in appearance bilaterally, mastoid prominence non-tender, external auditory canals clear bilaterally, tympanic membranes intact bilaterally without evidence of middle ear effusion or masses, normal appearing ossicles       Nose/Sinuses:  External appearance unremarkable, no maxillary or frontal sinus tenderness to palpation bilaterally  Anterior rhinoscopy reveals: a right septal deviation with 90% right sided obstruction, mucosal turbinate edema and erythema and enlargement       Oral Cavity:  Moist mucus membranes, gums unremarkable, no oral mucosal masses or lesions, floor of mouth soft, tongue mobile without masses or lesions  Absent upper arch front dentition  Poor dentition      Oropharynx:  Base of tongue soft and without masses, tonsils bilaterally unremarkable, soft palate mucosa unremarkable       Neck:  No visible or palpable cervical lesions or lymphadenopathy, thyroid gland is normal in size and symmetry and without masses, normal laryngeal elevation with swallowing       Cardiovascular:  Normal rate and rhythm, no palpable thrills, no jugulovenous distension observed    Respiratory:  Normal respiratory effort without evidence of retractions or use of accessory muscles  Integument:  Normal appearing without observed masses or lesions  Neurologic:  Cranial nerves II-XII intact bilaterally  Psychiatric:  Alert and oriented to time, place and person, normal affect      Procedures        Assessment:   1  Nasal septal deviation      2  Nasal turbinate hypertrophy            Orders  No orders of the defined types were placed in this encounter            Discussion/Plan:     1  He has a history of nasal obstruction due to cartilaginous nasal trauma following a fall in July  His underlying nasal congestion has been made worse by this fall and has a resultant deviated septum  Therefore, I recommended a septoplasty and bilateral inferior turbinate reduction  The risks, benefits and alternatives and surgery were discussed and his informed consent was obtained  Those risks include bleeding, infection, recurrence, scarring, need for more surgery, saddle nose deformity and septal perforation

## 2020-02-20 NOTE — DISCHARGE INSTRUCTIONS
Activity:  Light activity, no nose blowing, sneeze with your mouth open, change nasal drip pad as necessary, take your antibiotics as scheduled    Diet: Resume your regular diet      Special Instructions: Call or return with questions, concerns, or return of symptoms and No alcoholic drinks, driving, or operating heavy machinery while on prescribed pain medications    Call your physician for:  Shortness of breath that does not improve, Fever above 101ºF (38  3ºC) or shaking chills, Increasing sputum, Chest pain develops or worsens, Pain not controlled by the medication prescribed for you, Increased redness, swelling or drainage around your wound   or Questions or concerns

## 2020-02-20 NOTE — ANESTHESIA POSTPROCEDURE EVALUATION
Post-Op Assessment Note    CV Status:  Stable  Pain Score: 0    Pain management: adequate     Mental Status:  Alert and awake   Hydration Status:  Stable   PONV Controlled:  Controlled   Airway Patency:  Patent and adequate   Post Op Vitals Reviewed: Yes      Staff: CRNA   Comments:  To PACU stable, non-obstructive SV          BP      Temp     Pulse     Resp      SpO2

## 2020-02-20 NOTE — OP NOTE
OPERATIVE REPORT  PATIENT NAME: Darrell Gowers    :  1980  MRN: [id number]  Pt Location: [facility] OR ROOM 01    SURGERY DATE: 2020    Surgeon(s) and Role:     * Ovi Garrison MD - Primary    Preop Diagnosis:  Nasal septal deviation [J34 2]  Nasal turbinate hypertrophy [J34 3]    Post-Op Diagnosis Codes:     * Nasal septal deviation [J34 2]     * Nasal turbinate hypertrophy [J34 3]    Procedure(s) (LRB):  TURBINECTOMY (Inferior) (Bilateral)  SEPTOPLASTY (N/A)    Specimen(s):  * No specimens in log *    Estimated Blood Loss:   Minimal    Drains:  * No LDAs found *    Anesthesia Type:   General    Operative Indications:  Nasal septal deviation [J34 2]  Nasal turbinate hypertrophy [J34 3]      Operative Findings:  Deviated septum, turbinate enlargement    Complications:   None    Procedure and Technique:      Operative indications: The patient presents with longstanding nasal obstruction refractory to medical management  The risks, benefits and alternatives of septoplasty and bilateral inferior turbinate reduction were discussed preoperatively and signed informed consent was obtained  Operative course: The patient was identified and brought into the operating room and placed on the operating table in a supine position  General anesthesia was induced  A timeout was performed  The patient was prepped and draped in the usual fashion  The nasal septum was injected with 1% lidocaine with 1:100,000 epinephrine in a submucosal plane  4% soaked cocaine pledgets were inserted into the nose bilaterally  The pledgets were removed and the operation was begun  A hemitransfixion incision was made on the right side with a #15 blade  Submucoperichondrial flaps were elevated about the nasal septum bilaterally  The endoscope was introduced between these flaps  A portion of the quadrangular cartilage was resected with a swivel knife leaving greater than 1 cm caudal and dorsal struts for nasal tip support  Next, under endoscopic guidance, any additional bony or cartilaginous deviations were removed  Any deviation of the maxillary crest was freed from the overlying mucosa and reduced with an osteotome  Drainage holes were seen to be created on one mucoperichondrial flap  After adequate straightening of the septal bone and cartilage, the mucoperichondrial flaps were reapproximated with 4-0 chromic suture in a quilting fashion  The hemitransfixion incision was closed with interrupted 4-0 chromic sutures  Next, under endoscopic guidance a bilateral inferior turbinate reduction was performed  The turbinates were injected bilaterally with 1% lidocaine with 1:100,000 epinephrine  Bilateral submucosal tunnels were created along the long-axis of the turbinates with a Nancy elevator  A Turbinator blade was inserted into these tunnels bilaterally and any excess stromal soft tissue and bone was submucosally removed  Hemostasis was achieved with cautery  The turbinates were gently lateralized with a Dobbins bar  Bilateral Hwitman splints were then covered in Bacitracin and inserted and secured to the nasal septum with 3-0 prolene suture  The patient was then gently awakened from general anesthesia and transported to the PACU in stable condition  The procedure was tolerated well       I was present for the entire procedure    Patient Disposition:  PACU     SIGNATURE: Nicola Andrews MD  DATE: February 20, 2020  TIME: 8:18 AM

## 2020-02-20 NOTE — PERIOPERATIVE NURSING NOTE
No additional bleeding from nose  Headache improved per patient  Criteria met for discharge  IV removed  Instructions given to patient and mother; all questions answered

## 2020-03-03 ENCOUNTER — TELEPHONE (OUTPATIENT)
Dept: OBGYN CLINIC | Facility: HOSPITAL | Age: 40
End: 2020-03-03

## 2020-03-03 NOTE — TELEPHONE ENCOUNTER
Ever Malone, [facility] [phone]  Fax # [phone] is asking for next appt date, ovn & work note to be faxed  I told Ever Malone that the patient's last office visit was in December with no follow ups  Nothing was faxed & no further information was needed

## 2020-03-23 ENCOUNTER — TELEPHONE (OUTPATIENT)
Dept: OTOLARYNGOLOGY | Facility: CLINIC | Age: 40
End: 2020-03-23

## 2020-03-23 NOTE — TELEPHONE ENCOUNTER
Wilbur Rojas from [facility] called asking if Mr Rona Wylie' appointment could be moved up to this week, 3/26? Wilbur Rojas stated that he heard that all appointments would be cancelled in [place] unless otherwise an emergency  Samantha Panda that as of right now that's not the case & it's being taken day by day  There is an 8:30 opening this Thursday  Should this patient be seen this week? Last office visit was on 3/3  Please advise

## 2020-04-02 ENCOUNTER — TELEMEDICINE (OUTPATIENT)
Dept: OTOLARYNGOLOGY | Facility: CLINIC | Age: 40
End: 2020-04-02

## 2020-04-02 DIAGNOSIS — Z98.890 STATUS POST NASAL SEPTOPLASTY: Primary | ICD-10-CM

## 2020-04-02 PROCEDURE — 99024 POSTOP FOLLOW-UP VISIT: CPT | Performed by: OTOLARYNGOLOGY

## 2020-12-07 ENCOUNTER — TELEPHONE (OUTPATIENT)
Dept: FAMILY MEDICINE CLINIC | Facility: CLINIC | Age: 40
End: 2020-12-07

## 2020-12-07 ENCOUNTER — HOSPITAL ENCOUNTER (EMERGENCY)
Facility: HOSPITAL | Age: 40
Discharge: HOME | End: 2020-12-07
Attending: EMERGENCY MEDICINE
Payer: COMMERCIAL

## 2020-12-07 ENCOUNTER — APPOINTMENT (EMERGENCY)
Dept: RADIOLOGY | Facility: HOSPITAL | Age: 40
End: 2020-12-07
Payer: COMMERCIAL

## 2020-12-07 VITALS
SYSTOLIC BLOOD PRESSURE: 142 MMHG | RESPIRATION RATE: 20 BRPM | WEIGHT: 181 LBS | DIASTOLIC BLOOD PRESSURE: 82 MMHG | HEART RATE: 80 BPM | TEMPERATURE: 99.2 F | BODY MASS INDEX: 25.97 KG/M2 | OXYGEN SATURATION: 96 %

## 2020-12-07 DIAGNOSIS — K22.4 ESOPHAGEAL SPASM: ICD-10-CM

## 2020-12-07 DIAGNOSIS — K92.0 HEMATEMESIS: Primary | ICD-10-CM

## 2020-12-07 LAB
ANION GAP SERPL CALCULATED.3IONS-SCNC: 9 MMOL/L (ref 4–13)
BASOPHILS # BLD AUTO: 0.06 THOUSANDS/ΜL (ref 0–0.1)
BASOPHILS NFR BLD AUTO: 1 % (ref 0–1)
BUN SERPL-MCNC: 17 MG/DL (ref 5–25)
CALCIUM SERPL-MCNC: 9 MG/DL (ref 8.3–10.1)
CHLORIDE SERPL-SCNC: 103 MMOL/L (ref 100–108)
CO2 SERPL-SCNC: 26 MMOL/L (ref 21–32)
CREAT SERPL-MCNC: 0.99 MG/DL (ref 0.6–1.3)
EOSINOPHIL # BLD AUTO: 0.07 THOUSAND/ΜL (ref 0–0.61)
EOSINOPHIL NFR BLD AUTO: 1 % (ref 0–6)
ERYTHROCYTE [DISTWIDTH] IN BLOOD BY AUTOMATED COUNT: 13.4 % (ref 11.6–15.1)
GFR SERPL CREATININE-BSD FRML MDRD: 94 ML/MIN/1.73SQ M
GLUCOSE SERPL-MCNC: 89 MG/DL (ref 65–140)
HCT VFR BLD AUTO: 44.7 % (ref 36.5–49.3)
HGB BLD-MCNC: 14.9 G/DL (ref 12–17)
IMM GRANULOCYTES # BLD AUTO: 0.05 THOUSAND/UL (ref 0–0.2)
IMM GRANULOCYTES NFR BLD AUTO: 1 % (ref 0–2)
LYMPHOCYTES # BLD AUTO: 1.18 THOUSANDS/ΜL (ref 0.6–4.47)
LYMPHOCYTES NFR BLD AUTO: 11 % (ref 14–44)
MCH RBC QN AUTO: 29.9 PG (ref 26.8–34.3)
MCHC RBC AUTO-ENTMCNC: 33.3 G/DL (ref 31.4–37.4)
MCV RBC AUTO: 90 FL (ref 82–98)
MONOCYTES # BLD AUTO: 0.77 THOUSAND/ΜL (ref 0.17–1.22)
MONOCYTES NFR BLD AUTO: 7 % (ref 4–12)
NEUTROPHILS # BLD AUTO: 8.77 THOUSANDS/ΜL (ref 1.85–7.62)
NEUTS SEG NFR BLD AUTO: 79 % (ref 43–75)
NRBC BLD AUTO-RTO: 0 /100 WBCS
PLATELET # BLD AUTO: 280 THOUSANDS/UL (ref 149–390)
PMV BLD AUTO: 9.2 FL (ref 8.9–12.7)
POTASSIUM SERPL-SCNC: 4.4 MMOL/L (ref 3.5–5.3)
RBC # BLD AUTO: 4.98 MILLION/UL (ref 3.88–5.62)
SODIUM SERPL-SCNC: 138 MMOL/L (ref 136–145)
TROPONIN I SERPL-MCNC: <0.02 NG/ML
WBC # BLD AUTO: 10.9 THOUSAND/UL (ref 4.31–10.16)

## 2020-12-07 PROCEDURE — 99284 EMERGENCY DEPT VISIT MOD MDM: CPT

## 2020-12-07 PROCEDURE — 80048 BASIC METABOLIC PNL TOTAL CA: CPT | Performed by: EMERGENCY MEDICINE

## 2020-12-07 PROCEDURE — 71046 X-RAY EXAM CHEST 2 VIEWS: CPT

## 2020-12-07 PROCEDURE — 99285 EMERGENCY DEPT VISIT HI MDM: CPT | Performed by: EMERGENCY MEDICINE

## 2020-12-07 PROCEDURE — 36415 COLL VENOUS BLD VENIPUNCTURE: CPT | Performed by: EMERGENCY MEDICINE

## 2020-12-07 PROCEDURE — 85025 COMPLETE CBC W/AUTO DIFF WBC: CPT | Performed by: EMERGENCY MEDICINE

## 2020-12-07 PROCEDURE — 93005 ELECTROCARDIOGRAM TRACING: CPT

## 2020-12-07 PROCEDURE — 84484 ASSAY OF TROPONIN QUANT: CPT | Performed by: EMERGENCY MEDICINE

## 2020-12-07 RX ORDER — LIDOCAINE HYDROCHLORIDE 20 MG/ML
15 SOLUTION OROPHARYNGEAL ONCE
Status: COMPLETED | OUTPATIENT
Start: 2020-12-07 | End: 2020-12-07

## 2020-12-07 RX ORDER — MAGNESIUM HYDROXIDE/ALUMINUM HYDROXICE/SIMETHICONE 120; 1200; 1200 MG/30ML; MG/30ML; MG/30ML
15 SUSPENSION ORAL ONCE
Status: COMPLETED | OUTPATIENT
Start: 2020-12-07 | End: 2020-12-07

## 2020-12-07 RX ADMIN — LIDOCAINE HYDROCHLORIDE 15 ML: 20 SOLUTION ORAL; TOPICAL at 12:21

## 2020-12-07 RX ADMIN — ALUMINUM HYDROXIDE, MAGNESIUM HYDROXIDE, AND SIMETHICONE 15 ML: 200; 200; 20 SUSPENSION ORAL at 12:21

## 2020-12-08 LAB
ATRIAL RATE: 74 BPM
P AXIS: 46 DEGREES
PR INTERVAL: 132 MS
QRS AXIS: 74 DEGREES
QRSD INTERVAL: 82 MS
QT INTERVAL: 362 MS
QTC INTERVAL: 388 MS
T WAVE AXIS: 59 DEGREES
VENTRICULAR RATE: 69 BPM

## 2020-12-08 PROCEDURE — 93010 ELECTROCARDIOGRAM REPORT: CPT | Performed by: INTERNAL MEDICINE

## 2020-12-10 ENCOUNTER — OFFICE VISIT (OUTPATIENT)
Dept: GASTROENTEROLOGY | Facility: AMBULARY SURGERY CENTER | Age: 40
End: 2020-12-10
Payer: COMMERCIAL

## 2020-12-10 VITALS — WEIGHT: 179 LBS | BODY MASS INDEX: 25.62 KG/M2 | TEMPERATURE: 97.7 F | HEIGHT: 70 IN | HEART RATE: 79 BPM

## 2020-12-10 DIAGNOSIS — K59.09 CHRONIC CONSTIPATION: ICD-10-CM

## 2020-12-10 DIAGNOSIS — K92.0 HEMATEMESIS, PRESENCE OF NAUSEA NOT SPECIFIED: ICD-10-CM

## 2020-12-10 DIAGNOSIS — R13.19 ESOPHAGEAL DYSPHAGIA: Primary | ICD-10-CM

## 2020-12-10 PROBLEM — R13.10 DYSPHAGIA: Status: ACTIVE | Noted: 2020-12-10

## 2020-12-10 PROCEDURE — 99244 OFF/OP CNSLTJ NEW/EST MOD 40: CPT | Performed by: INTERNAL MEDICINE

## 2020-12-10 RX ORDER — PANTOPRAZOLE SODIUM 40 MG/1
40 TABLET, DELAYED RELEASE ORAL DAILY
Qty: 30 TABLET | Refills: 3 | Status: SHIPPED | OUTPATIENT
Start: 2020-12-10 | End: 2021-03-25

## 2020-12-28 ENCOUNTER — HOSPITAL ENCOUNTER (OUTPATIENT)
Dept: GASTROENTEROLOGY | Facility: HOSPITAL | Age: 40
Setting detail: OUTPATIENT SURGERY
Discharge: HOME | End: 2020-12-28
Attending: INTERNAL MEDICINE | Admitting: INTERNAL MEDICINE
Payer: COMMERCIAL

## 2020-12-28 ENCOUNTER — ANESTHESIA EVENT (OUTPATIENT)
Dept: GASTROENTEROLOGY | Facility: HOSPITAL | Age: 40
End: 2020-12-28

## 2020-12-28 ENCOUNTER — ANESTHESIA (OUTPATIENT)
Dept: GASTROENTEROLOGY | Facility: HOSPITAL | Age: 40
End: 2020-12-28

## 2020-12-28 VITALS
WEIGHT: 179 LBS | HEART RATE: 63 BPM | BODY MASS INDEX: 25.62 KG/M2 | OXYGEN SATURATION: 97 % | DIASTOLIC BLOOD PRESSURE: 79 MMHG | TEMPERATURE: 97.6 F | RESPIRATION RATE: 16 BRPM | SYSTOLIC BLOOD PRESSURE: 114 MMHG | HEIGHT: 70 IN

## 2020-12-28 VITALS — HEART RATE: 59 BPM

## 2020-12-28 DIAGNOSIS — K92.0 HEMATEMESIS, PRESENCE OF NAUSEA NOT SPECIFIED: ICD-10-CM

## 2020-12-28 DIAGNOSIS — R13.19 ESOPHAGEAL DYSPHAGIA: ICD-10-CM

## 2020-12-28 PROBLEM — R11.2 PONV (POSTOPERATIVE NAUSEA AND VOMITING): Status: ACTIVE | Noted: 2020-12-28

## 2020-12-28 PROBLEM — Z98.890 PONV (POSTOPERATIVE NAUSEA AND VOMITING): Status: ACTIVE | Noted: 2020-12-28

## 2020-12-28 PROCEDURE — 43239 EGD BIOPSY SINGLE/MULTIPLE: CPT | Performed by: INTERNAL MEDICINE

## 2020-12-28 PROCEDURE — 88305 TISSUE EXAM BY PATHOLOGIST: CPT | Performed by: PATHOLOGY

## 2020-12-28 RX ORDER — PROPOFOL 10 MG/ML
INJECTION, EMULSION INTRAVENOUS CONTINUOUS PRN
Status: DISCONTINUED | OUTPATIENT
Start: 2020-12-28 | End: 2020-12-28

## 2020-12-28 RX ORDER — PROPOFOL 10 MG/ML
INJECTION, EMULSION INTRAVENOUS AS NEEDED
Status: DISCONTINUED | OUTPATIENT
Start: 2020-12-28 | End: 2020-12-28

## 2020-12-28 RX ORDER — SODIUM CHLORIDE, SODIUM LACTATE, POTASSIUM CHLORIDE, CALCIUM CHLORIDE 600; 310; 30; 20 MG/100ML; MG/100ML; MG/100ML; MG/100ML
50 INJECTION, SOLUTION INTRAVENOUS CONTINUOUS
Status: DISCONTINUED | OUTPATIENT
Start: 2020-12-28 | End: 2021-01-01 | Stop reason: HOSPADM

## 2020-12-28 RX ORDER — SODIUM CHLORIDE, SODIUM LACTATE, POTASSIUM CHLORIDE, CALCIUM CHLORIDE 600; 310; 30; 20 MG/100ML; MG/100ML; MG/100ML; MG/100ML
INJECTION, SOLUTION INTRAVENOUS CONTINUOUS PRN
Status: DISCONTINUED | OUTPATIENT
Start: 2020-12-28 | End: 2020-12-28

## 2020-12-28 RX ORDER — LIDOCAINE HYDROCHLORIDE 10 MG/ML
INJECTION, SOLUTION EPIDURAL; INFILTRATION; INTRACAUDAL; PERINEURAL AS NEEDED
Status: DISCONTINUED | OUTPATIENT
Start: 2020-12-28 | End: 2020-12-28

## 2020-12-28 RX ADMIN — SODIUM CHLORIDE, SODIUM LACTATE, POTASSIUM CHLORIDE, AND CALCIUM CHLORIDE: .6; .31; .03; .02 INJECTION, SOLUTION INTRAVENOUS at 11:16

## 2020-12-28 RX ADMIN — PROPOFOL 200 MG: 10 INJECTION, EMULSION INTRAVENOUS at 11:17

## 2020-12-28 RX ADMIN — LIDOCAINE HYDROCHLORIDE 50 MG: 10 INJECTION, SOLUTION EPIDURAL; INFILTRATION; INTRACAUDAL at 11:17

## 2020-12-28 RX ADMIN — PROPOFOL 120 MCG/KG/MIN: 10 INJECTION, EMULSION INTRAVENOUS at 11:17

## 2020-12-28 NOTE — H&P
History and Physical - [facility]  Judy Fletcher 41 y o  male MRN: [id number]    HPI: Judy Fletcher is a 41y o  year old male who presents with dysphagia, hematemesis  Review of Systems    Historical Information   Past Medical History:   Diagnosis Date    Esophageal spasm     Fall     fell off trailer  at work 8/2019 injured wrist and had facial injuries    GERD (gastroesophageal reflux disease)     Hematemesis     Radius/ulna fracture 08/2019    Right    Small bowel obstruction [facility])      Past Surgical History:   Procedure Laterality Date    WI EXCISION TURBINATE,SUBMUCOUS Bilateral 2/20/2020    Procedure: TURBINECTOMY (Inferior); Surgeon: Emile Mcclain MD;  Location: [address] [place] [address];  Service: ENT    WI OPEN RX DISTAL RADIUS FX, EXTRA-ARTICULAR Right 8/21/2019    Procedure: OPEN REDUCTION W/ INTERNAL FIXATION (ORIF) RADIUS / ULNA (WRIST);   Surgeon: Camila Boss DO;  Location: [address] [place] [address];  Service: Orthopedics    WI REPAIR OF NASAL SEPTUM N/A 2/20/2020    Procedure: SEPTOPLASTY;  Surgeon: Emile Mcclain MD;  Location: [facility];  Service: ENT    WISDOM TOOTH EXTRACTION       Social History   Social History     Substance and Sexual Activity   Alcohol Use Yes    Frequency: 2-4 times a month     Social History     Substance and Sexual Activity   Drug Use No     Social History     Tobacco Use   Smoking Status Never Smoker   Smokeless Tobacco Never Used     Family History   Problem Relation Age of Onset    Arthritis Mother     Asthma Sister     No Known Problems Son        Meds/Allergies     (Not in a hospital admission)      No Known Allergies    Objective     /78   Pulse 55   Temp (!) 96 4 °F (35 8 °C) (Temporal)   Resp 18   Ht 5' 10" (1 778 m)   Wt 81 2 kg (179 lb)   SpO2 98%   BMI 25 68 kg/m²       PHYSICAL EXAM    Gen: NAD  CV: RRR  CHEST: Clear  ABD: soft, NT/ND  EXT: no edema  Neuro: AAO      ASSESSMENT/PLAN:  This is a 41y o  year old male here for dysphagia, hematemesis         PLAN:   Procedure: Isabell Pineda

## 2021-01-16 ENCOUNTER — TELEPHONE (OUTPATIENT)
Dept: GASTROENTEROLOGY | Facility: AMBULARY SURGERY CENTER | Age: 41
End: 2021-01-16

## 2021-01-16 NOTE — TELEPHONE ENCOUNTER
----- Message from Donovan Hernandez MD sent at 12/29/2020  5:13 PM EST -----  Please inform the patient biopsies from stomach were negative for H pylori  Biopsies from esophagus show only a few eosinophils due to acid reflux but no signs of eosinophilic esophagitis which is good news  Please make sure that he continues to pantoprazole as prescribed, he should not stop this medication, please schedule office follow-up in 3 months time, please have the patient call with any questions, concerns, worsening symptoms

## 2021-01-16 NOTE — LETTER
January 16, 2021    [address] [facility] [address]          Dear Mr Johanna Hoover: We have attempted to reach you regarding your results with no response  We ask that you contact our office upon receipt of this letter to receive your results      Thank you in advance for your cooperation and assistance         Sincerely,                     [facility]            CC:  No Recipients

## 2021-03-05 ENCOUNTER — TELEPHONE (OUTPATIENT)
Dept: GASTROENTEROLOGY | Facility: AMBULARY SURGERY CENTER | Age: 41
End: 2021-03-05

## 2021-03-25 DIAGNOSIS — R13.19 ESOPHAGEAL DYSPHAGIA: ICD-10-CM

## 2021-03-25 DIAGNOSIS — K92.0 HEMATEMESIS, PRESENCE OF NAUSEA NOT SPECIFIED: ICD-10-CM

## 2021-03-25 RX ORDER — PANTOPRAZOLE SODIUM 40 MG/1
TABLET, DELAYED RELEASE ORAL
Qty: 30 TABLET | Refills: 3 | Status: SHIPPED | OUTPATIENT
Start: 2021-03-25

## 2022-12-02 NOTE — PROGRESS NOTES
H&P reviewed. The patient was examined and there are no changes to the H&P.      I have explained the risks associated with the procedure to the patient including but not limited to an allergic reaction to the contrast material or medications used during the procedure bleeding, infection, and bruising at the catheter insertion site blood clots, which may trigger heart attack, stroke,   damage to the artery where the catheter was inserted, or damage to the arteries as the catheter travels through your body, irregular heart rhythm arrhythmias, kidney damage caused by the contrast material.     Daily Note     Today's date: 2019  Patient name: Monica Rodriguez  : 1980  MRN: [id number]  Referring provider: Saul Martinez DO  Dx:   Encounter Diagnosis     ICD-10-CM    1  Closed fracture of distal ends of right radius and ulna with routine healing, subsequent encounter S52 501D     S51 824U          Subjective: Patient reports pain level as 0/10 unless stretching to end range  Objective:   Initiated treatment with double hot pack x ~5min for warm up to R wrist      Completed manual therapy with PROM  myofascial soft tissue work and Graston techniques to forearm flexors and extensors and wrist joint to increase wrist ROM  Completed  exercises and activities to increase ROM, strength, coordination and function  Home Program:See Media Section for details  wrist ROM ex   light green gripper    Precautions: s/p ORIF 19      Exercise Diary  19      Weight Well- no weight Pron/Sup  Reps x2  Pron/Sup  Reps x 3 cycles      Wrist Roll  Flex/Ext  Pro/Sup   x5 each        Wrist maze   x5 x5      Rubber Bands Red x10  Yellow x10 Red x10  Yellow x10      Clothespegs Light x20  Heavy x20 Light x20  Heavy x20      grippers  2Y x20                                                                                    Assessment: ROM doing well for how early he is post sx  Patient tolerated treatment well  Plan: Continue Occupational Therapy ~2x/week to decrease pain and edema and improve ROM, strength and function

## (undated) DEVICE — FABRIC REINFORCED, SURGICAL GOWN, XL: Brand: CONVERTORS

## (undated) DEVICE — SPLINT 1524050 5PK PAIR DOYLE II AIRWAY: Brand: DOYLE II ™

## (undated) DEVICE — BRUSH EZ SCRUB PCMX W/NAIL CLEANER

## (undated) DEVICE — DRESSING GUAZE ADH BORDER 4 X 4 IN

## (undated) DEVICE — 3M™ STERI-STRIP™ REINFORCED ADHESIVE SKIN CLOSURES, R1547, 1/2 IN X 4 IN (12 MM X 100 MM), 6 STRIPS/ENVELOPE: Brand: 3M™ STERI-STRIP™

## (undated) DEVICE — STOCKINETTE REGULAR

## (undated) DEVICE — SUT PROLENE 3-0 SH 36 IN 8522H

## (undated) DEVICE — SUT MONOCRYL 5-0 P-3 18 IN Y493G

## (undated) DEVICE — NEURO PATTIES 1/2 X 3

## (undated) DEVICE — SUT VICRYL 4-0 P-3 18 IN J494G

## (undated) DEVICE — 1.25MM KIRSCHNER WIRE W/TROCAR POINT 150MM
Type: IMPLANTABLE DEVICE | Site: WRIST | Status: NON-FUNCTIONAL
Removed: 2019-08-21

## (undated) DEVICE — SYRINGE 50ML LL

## (undated) DEVICE — SCD SEQUENTIAL COMPRESSION COMFORT SLEEVE MEDIUM KNEE LENGTH: Brand: KENDALL SCD

## (undated) DEVICE — SPECIMEN CONTAINER STERILE PEEL PACK

## (undated) DEVICE — CAST PLASTER 4 IN ROLL

## (undated) DEVICE — DRAPE SHEET THREE QUARTER

## (undated) DEVICE — SUT CHROMIC 4-0 RB-1 27 IN U203H

## (undated) DEVICE — COBAN 4 IN STERILE

## (undated) DEVICE — PACK GENERAL LF

## (undated) DEVICE — 2.0MM DRILL BIT/QC/100MM

## (undated) DEVICE — PAD CAST 3 IN COTTON NON STRL

## (undated) DEVICE — BASIC DOUBLE BASIN 2-LF: Brand: MEDLINE INDUSTRIES, INC.

## (undated) DEVICE — COTTON TIP APPLICTOR 2 PK

## (undated) DEVICE — 1.8MM DRILL BIT WITH DEPTH MARK/QC/110MM

## (undated) DEVICE — ACE WRAP 3 IN VELCRO LATEX FREE

## (undated) DEVICE — WAND COBLATION PLASMA TURBINATOR REDUCTION

## (undated) DEVICE — 1.6MM KIRSCHNER WIRE W/TROCAR POINT 150MM
Type: IMPLANTABLE DEVICE | Site: WRIST | Status: NON-FUNCTIONAL
Removed: 2019-08-21

## (undated) DEVICE — CUFF TOURNIQUET 18 X 4 IN QUICK CONNECT DISP 1 BLADDER

## (undated) DEVICE — GLOVE SRG BIOGEL ECLIPSE 7

## (undated) DEVICE — DRAPE C-ARM X-RAY

## (undated) DEVICE — ARM SLING: Brand: DEROYAL

## (undated) DEVICE — TIBURON HAND DRAPE: Brand: CONVERTORS

## (undated) DEVICE — PADDING CAST 3IN COTTON STRL

## (undated) DEVICE — INTENDED FOR TISSUE SEPARATION, AND OTHER PROCEDURES THAT REQUIRE A SHARP SURGICAL BLADE TO PUNCTURE OR CUT.: Brand: BARD-PARKER SAFETY BLADES SIZE 15, STERILE

## (undated) DEVICE — 2.4MM CORTEX SCREW SLF-TPNG WITH T8 STARDRIVE RECESS 28MM
Type: IMPLANTABLE DEVICE | Site: WRIST | Status: NON-FUNCTIONAL
Removed: 2019-08-21

## (undated) DEVICE — INTENDED FOR TISSUE SEPARATION, AND OTHER PROCEDURES THAT REQUIRE A SHARP SURGICAL BLADE TO PUNCTURE OR CUT.: Brand: BARD-PARKER ® CARBON RIB-BACK BLADES

## (undated) DEVICE — ACE WRAP 4 IN UNSTERILE

## (undated) DEVICE — TUBING SUCTION 5MM X 12 FT

## (undated) DEVICE — ACE WRAP 3 IN UNSTERILE

## (undated) DEVICE — ANTI-FOG SOLUTION WITH FOAM PAD: Brand: DEVON

## (undated) DEVICE — GLOVE SRG BIOGEL 7.5

## (undated) DEVICE — SUT VICRYL 5-0 P-S 18 IN J493G

## (undated) DEVICE — VIAL DECANTER

## (undated) DEVICE — MAGNETIC INSTRUMENT PAD 16" X 20"; LARGE; DISPOSABLE: Brand: CARDINAL HEALTH

## (undated) DEVICE — GLOVE INDICATOR PI UNDERGLOVE SZ 7.5 BLUE

## (undated) DEVICE — 3M™ STERI-DRAPE™ U-DRAPE 1015: Brand: STERI-DRAPE™

## (undated) DEVICE — DENTAL PACK: Brand: CARDINAL HEALTH

## (undated) DEVICE — BANDAGE, ESMARK LF STR 4"X9'(20/CS): Brand: CYPRESS